# Patient Record
Sex: FEMALE | Race: WHITE | NOT HISPANIC OR LATINO | Employment: OTHER | URBAN - METROPOLITAN AREA
[De-identification: names, ages, dates, MRNs, and addresses within clinical notes are randomized per-mention and may not be internally consistent; named-entity substitution may affect disease eponyms.]

---

## 2017-07-27 ENCOUNTER — TRANSCRIBE ORDERS (OUTPATIENT)
Dept: ADMINISTRATIVE | Facility: HOSPITAL | Age: 72
End: 2017-07-27

## 2017-07-27 ENCOUNTER — HOSPITAL ENCOUNTER (OUTPATIENT)
Dept: CARDIOLOGY | Facility: HOSPITAL | Age: 72
Discharge: HOME | End: 2017-07-27
Attending: ORTHOPAEDIC SURGERY | Admitting: ORTHOPAEDIC SURGERY

## 2017-07-27 ENCOUNTER — LAB (OUTPATIENT)
Dept: LAB | Facility: HOSPITAL | Age: 72
End: 2017-07-27
Attending: ORTHOPAEDIC SURGERY

## 2017-07-27 DIAGNOSIS — Z01.811 PRE-OP CHEST EXAM: Primary | ICD-10-CM

## 2017-07-27 DIAGNOSIS — Z01.811 PRE-OP CHEST EXAM: ICD-10-CM

## 2017-07-27 DIAGNOSIS — Z01.818 PRE-OP EXAM: ICD-10-CM

## 2017-07-27 DIAGNOSIS — M20.10 HALLUX VALGUS (ACQUIRED), UNSPECIFIED FOOT: ICD-10-CM

## 2017-07-27 DIAGNOSIS — M20.10 HALLUX VALGUS (ACQUIRED), UNSPECIFIED FOOT: Primary | ICD-10-CM

## 2017-07-27 LAB
ALBUMIN SERPL-MCNC: 4.2 G/DL (ref 3.5–5.2)
ALBUMIN/GLOB SERPL: 1.3 G/DL
ALP SERPL-CCNC: 63 U/L (ref 39–117)
ALT SERPL W P-5'-P-CCNC: 20 U/L (ref 1–33)
ANION GAP SERPL CALCULATED.3IONS-SCNC: 12 MMOL/L
AST SERPL-CCNC: 23 U/L (ref 1–32)
BASOPHILS # BLD AUTO: 0.03 10*3/MM3 (ref 0–0.2)
BASOPHILS NFR BLD AUTO: 0.4 % (ref 0–1.5)
BILIRUB SERPL-MCNC: 0.4 MG/DL (ref 0.1–1.2)
BUN BLD-MCNC: 11 MG/DL (ref 8–23)
BUN/CREAT SERPL: 12.4 (ref 7–25)
CALCIUM SPEC-SCNC: 9.8 MG/DL (ref 8.6–10.5)
CHLORIDE SERPL-SCNC: 95 MMOL/L (ref 98–107)
CO2 SERPL-SCNC: 26 MMOL/L (ref 22–29)
CREAT BLD-MCNC: 0.89 MG/DL (ref 0.57–1)
DEPRECATED RDW RBC AUTO: 46.4 FL (ref 37–54)
EOSINOPHIL # BLD AUTO: 0.27 10*3/MM3 (ref 0–0.7)
EOSINOPHIL NFR BLD AUTO: 3.8 % (ref 0.3–6.2)
ERYTHROCYTE [DISTWIDTH] IN BLOOD BY AUTOMATED COUNT: 14.5 % (ref 11.7–13)
GFR SERPL CREATININE-BSD FRML MDRD: 62 ML/MIN/1.73
GLOBULIN UR ELPH-MCNC: 3.2 GM/DL
GLUCOSE BLD-MCNC: 125 MG/DL (ref 65–99)
HCT VFR BLD AUTO: 40.4 % (ref 35.6–45.5)
HGB BLD-MCNC: 13.2 G/DL (ref 11.9–15.5)
IMM GRANULOCYTES # BLD: 0.02 10*3/MM3 (ref 0–0.03)
IMM GRANULOCYTES NFR BLD: 0.3 % (ref 0–0.5)
LYMPHOCYTES # BLD AUTO: 0.96 10*3/MM3 (ref 0.9–4.8)
LYMPHOCYTES NFR BLD AUTO: 13.5 % (ref 19.6–45.3)
MCH RBC QN AUTO: 28.6 PG (ref 26.9–32)
MCHC RBC AUTO-ENTMCNC: 32.7 G/DL (ref 32.4–36.3)
MCV RBC AUTO: 87.6 FL (ref 80.5–98.2)
MONOCYTES # BLD AUTO: 0.76 10*3/MM3 (ref 0.2–1.2)
MONOCYTES NFR BLD AUTO: 10.7 % (ref 5–12)
NEUTROPHILS # BLD AUTO: 5.07 10*3/MM3 (ref 1.9–8.1)
NEUTROPHILS NFR BLD AUTO: 71.3 % (ref 42.7–76)
PLATELET # BLD AUTO: 265 10*3/MM3 (ref 140–500)
PMV BLD AUTO: 9.8 FL (ref 6–12)
POTASSIUM BLD-SCNC: 4.4 MMOL/L (ref 3.5–5.2)
PROT SERPL-MCNC: 7.4 G/DL (ref 6–8.5)
RBC # BLD AUTO: 4.61 10*6/MM3 (ref 3.9–5.2)
SODIUM BLD-SCNC: 133 MMOL/L (ref 136–145)
WBC NRBC COR # BLD: 7.11 10*3/MM3 (ref 4.5–10.7)

## 2017-07-27 PROCEDURE — 85025 COMPLETE CBC W/AUTO DIFF WBC: CPT

## 2017-07-27 PROCEDURE — 36415 COLL VENOUS BLD VENIPUNCTURE: CPT

## 2017-07-27 PROCEDURE — 80053 COMPREHEN METABOLIC PANEL: CPT

## 2017-07-27 PROCEDURE — 93010 ELECTROCARDIOGRAM REPORT: CPT | Performed by: INTERNAL MEDICINE

## 2017-07-27 PROCEDURE — 93005 ELECTROCARDIOGRAM TRACING: CPT | Performed by: ORTHOPAEDIC SURGERY

## 2017-12-18 ENCOUNTER — HOSPITAL ENCOUNTER (OUTPATIENT)
Dept: MRI IMAGING | Facility: HOSPITAL | Age: 72
Discharge: HOME | End: 2017-12-18
Admitting: NURSE PRACTITIONER

## 2017-12-18 DIAGNOSIS — I67.1 CEREBRAL ANEURYSM, NONRUPTURED: ICD-10-CM

## 2017-12-18 PROCEDURE — 70544 MR ANGIOGRAPHY HEAD W/O DYE: CPT

## 2018-01-24 ENCOUNTER — OFFICE VISIT (OUTPATIENT)
Dept: NEUROSURGERY | Facility: CLINIC | Age: 73
End: 2018-01-24

## 2018-01-24 VITALS
SYSTOLIC BLOOD PRESSURE: 126 MMHG | HEIGHT: 66 IN | RESPIRATION RATE: 18 BRPM | HEART RATE: 100 BPM | WEIGHT: 178 LBS | BODY MASS INDEX: 28.61 KG/M2 | DIASTOLIC BLOOD PRESSURE: 72 MMHG

## 2018-01-24 DIAGNOSIS — R90.89 ABNORMAL BRAIN MRI: ICD-10-CM

## 2018-01-24 DIAGNOSIS — I72.9 ANEURYSM (HCC): Primary | ICD-10-CM

## 2018-01-24 PROCEDURE — 99213 OFFICE O/P EST LOW 20 MIN: CPT | Performed by: NURSE PRACTITIONER

## 2018-01-24 NOTE — PROGRESS NOTES
"Subjective   Patient ID: Elena Siddiqui is a 72 y.o. female is here today for one year follow-up aneurysm. Patient presents accompanied by her .     History of Present Illness     She returns the office today for ongoing follow-up with known history of a cerebral aneurysm that has been followed with serial imaging.  She has undergone a new MRA imaging of the head at [facility] on December 18, 2017.  She has had no prior surgical intervention.  She was last here in December 2016 an MRI imaging at that time revealed stable findings.  She has a 3 mm right PCA aneurysm.    She denies any issues today with the exception of a few falls over the past 6 months.  She attributes this to a hammertoe problem with her foot that was causing her to walk \"weirdly.\"  She had surgery to correct the problem and is hopeful that her gait will improve.  She denies headaches, vision changes or any other neuro deficits with the exception of the falls.    She presents with her .      /72 (BP Location: Right arm, Patient Position: Sitting)  Pulse 100  Resp 18  Ht 167.6 cm (65.98\")  Wt 80.7 kg (178 lb)  BMI 28.74 kg/m2      The following portions of the patient's history were reviewed and updated as appropriate: allergies, current medications, past family history, past medical history, past social history, past surgical history and problem list.    Review of Systems   HENT: Negative for tinnitus.    Eyes: Negative for visual disturbance.   Musculoskeletal: Positive for gait problem.   Neurological: Positive for headaches (occ'l). Negative for dizziness, tremors, seizures, syncope, speech difficulty, weakness, light-headedness and numbness.   Psychiatric/Behavioral: Negative for confusion and decreased concentration.       Objective   Physical Exam   Constitutional: She appears well-developed and well-nourished. She is cooperative.   Very pleasant, older female   HENT:   Head: Atraumatic.   Neck: Neck supple. "   Pulmonary/Chest: Effort normal and breath sounds normal. No respiratory distress.   Abdominal: Soft.   Musculoskeletal: She exhibits no deformity.   Neurological: She is alert. She has normal strength and normal reflexes. She displays normal reflexes. No cranial nerve deficit or sensory deficit. Coordination and gait normal. GCS eye subscore is 4. GCS verbal subscore is 5. GCS motor subscore is 6.   Stable, upright gait, normal station  Cranial nerves II through XII grossly intact   Skin: Skin is warm and dry.   Psychiatric: She has a normal mood and affect. Her behavior is normal. Judgment and thought content normal.   Vitals reviewed.    Neurologic Exam     Motor Exam     Strength   Strength 5/5 throughout.       Assessment/Plan   Independent Review of Radiographic Studies:    MRA of the head from [facility] dated December 18, 2017 reveals no change when compared to prior to studies.  The cerebral aneurysm measures 3.8 x 3.1 x 3 mm in the right ICA, as reviewed and discussed with Dr. Hurley.    Medical Decision Making:    She returns to the office today for one-year follow-up with known history of a cerebral aneurysm.  Exam as noted above, no red flags.  She has no neuro changes or deficits on exam.  She is doing and feeling well.  MRA imaging compared to the prior 2 studies shows no changes.  After discussing the imaging findings with Dr. Hurley, we will have her return to the office in 5 years.  I did reiterate signs and symptoms of aneurysm rupture and told her that she should go to the ER with any of these changes.  She expressed understanding.  We will have her return the office in 5 years with repeat imaging.  She is to call at anytime with any questions or concerns.    Plan: Return to office in 5 years with MRA head  Elena was seen today for cerebral aneurysm.    Diagnoses and all orders for this visit:    Aneurysm  -     MRI Angiogram Head Without Contrast; Future    Abnormal brain MRI  -      MRI Angiogram Head Without Contrast; Future      Return in about 5 years (around 1/24/2023).

## 2018-02-06 ENCOUNTER — APPOINTMENT (OUTPATIENT)
Dept: PREADMISSION TESTING | Facility: HOSPITAL | Age: 73
End: 2018-02-06

## 2018-02-06 VITALS
BODY MASS INDEX: 29.57 KG/M2 | SYSTOLIC BLOOD PRESSURE: 149 MMHG | WEIGHT: 184 LBS | DIASTOLIC BLOOD PRESSURE: 79 MMHG | OXYGEN SATURATION: 97 % | HEIGHT: 66 IN | TEMPERATURE: 97.1 F | HEART RATE: 100 BPM | RESPIRATION RATE: 20 BRPM

## 2018-02-06 LAB
ALBUMIN SERPL-MCNC: 4.1 G/DL (ref 3.5–5.2)
ALBUMIN/GLOB SERPL: 1.3 G/DL
ALP SERPL-CCNC: 60 U/L (ref 39–117)
ALT SERPL W P-5'-P-CCNC: 15 U/L (ref 1–33)
ANION GAP SERPL CALCULATED.3IONS-SCNC: 14.3 MMOL/L
AST SERPL-CCNC: 18 U/L (ref 1–32)
BASOPHILS # BLD AUTO: 0.03 10*3/MM3 (ref 0–0.2)
BASOPHILS NFR BLD AUTO: 0.3 % (ref 0–1.5)
BILIRUB SERPL-MCNC: 0.2 MG/DL (ref 0.1–1.2)
BUN BLD-MCNC: 18 MG/DL (ref 8–23)
BUN/CREAT SERPL: 17.5 (ref 7–25)
CALCIUM SPEC-SCNC: 9.2 MG/DL (ref 8.6–10.5)
CHLORIDE SERPL-SCNC: 87 MMOL/L (ref 98–107)
CO2 SERPL-SCNC: 23.7 MMOL/L (ref 22–29)
CREAT BLD-MCNC: 1.03 MG/DL (ref 0.57–1)
DEPRECATED RDW RBC AUTO: 44.7 FL (ref 37–54)
EOSINOPHIL # BLD AUTO: 0.24 10*3/MM3 (ref 0–0.7)
EOSINOPHIL NFR BLD AUTO: 2.2 % (ref 0.3–6.2)
ERYTHROCYTE [DISTWIDTH] IN BLOOD BY AUTOMATED COUNT: 14.3 % (ref 11.7–13)
GFR SERPL CREATININE-BSD FRML MDRD: 52 ML/MIN/1.73
GLOBULIN UR ELPH-MCNC: 3.2 GM/DL
GLUCOSE BLD-MCNC: 123 MG/DL (ref 65–99)
HCT VFR BLD AUTO: 40.2 % (ref 35.6–45.5)
HGB BLD-MCNC: 13.2 G/DL (ref 11.9–15.5)
IMM GRANULOCYTES # BLD: 0.04 10*3/MM3 (ref 0–0.03)
IMM GRANULOCYTES NFR BLD: 0.4 % (ref 0–0.5)
LYMPHOCYTES # BLD AUTO: 1.89 10*3/MM3 (ref 0.9–4.8)
LYMPHOCYTES NFR BLD AUTO: 17.3 % (ref 19.6–45.3)
MCH RBC QN AUTO: 28.6 PG (ref 26.9–32)
MCHC RBC AUTO-ENTMCNC: 32.8 G/DL (ref 32.4–36.3)
MCV RBC AUTO: 87 FL (ref 80.5–98.2)
MONOCYTES # BLD AUTO: 0.94 10*3/MM3 (ref 0.2–1.2)
MONOCYTES NFR BLD AUTO: 8.6 % (ref 5–12)
NEUTROPHILS # BLD AUTO: 7.77 10*3/MM3 (ref 1.9–8.1)
NEUTROPHILS NFR BLD AUTO: 71.2 % (ref 42.7–76)
PLATELET # BLD AUTO: 300 10*3/MM3 (ref 140–500)
PMV BLD AUTO: 9.6 FL (ref 6–12)
POTASSIUM BLD-SCNC: 3.9 MMOL/L (ref 3.5–5.2)
PROT SERPL-MCNC: 7.3 G/DL (ref 6–8.5)
RBC # BLD AUTO: 4.62 10*6/MM3 (ref 3.9–5.2)
SODIUM BLD-SCNC: 125 MMOL/L (ref 136–145)
WBC NRBC COR # BLD: 10.91 10*3/MM3 (ref 4.5–10.7)

## 2018-02-06 PROCEDURE — 93005 ELECTROCARDIOGRAM TRACING: CPT

## 2018-02-06 PROCEDURE — 80053 COMPREHEN METABOLIC PANEL: CPT | Performed by: ORTHOPAEDIC SURGERY

## 2018-02-06 PROCEDURE — 93010 ELECTROCARDIOGRAM REPORT: CPT | Performed by: INTERNAL MEDICINE

## 2018-02-06 PROCEDURE — 85025 COMPLETE CBC W/AUTO DIFF WBC: CPT | Performed by: ORTHOPAEDIC SURGERY

## 2018-02-06 PROCEDURE — 36415 COLL VENOUS BLD VENIPUNCTURE: CPT

## 2018-02-06 NOTE — DISCHARGE INSTRUCTIONS
Take the following medications the morning of surgery with a small sip of water:  NONE      General Instructions:  • Do not eat solid food after midnight the night before surgery.  • You may drink clear liquids day of surgery but must stop at least one hour before your hospital arrival time.  • It is beneficial for you to have a clear drink that contains carbohydrates the day of surgery.  We suggest a 12 to 20 ounce bottle of Gatorade or Powerade for non-diabetic patients or a 12 to 20 ounce bottle of G2 or Powerade Zero for diabetic patients. (Pediatric patients, are not advised to drink a 12 to 20 ounce carbohydrate drink)    Clear liquids are liquids you can see through.  Nothing red in color.     Plain water                               Sports drinks  Sodas                                   Gelatin (Jell-O)  Fruit juices without pulp such as white grape juice and apple juice  Popsicles that contain no fruit or yogurt  Tea or coffee (no cream or milk added)  Gatorade / Powerade  G2 / Powerade Zero    • Infants may have breast milk up to four hours before surgery.  • Infants drinking formula may drink formula up to six hours before surgery.   • Patients who avoid smoking, chewing tobacco and alcohol for 4 weeks prior to surgery have a reduced risk of post-operative complications.  Quit smoking as many days before surgery as you can.  • Do not smoke, use chewing tobacco or drink alcohol the day of surgery.   • If applicable bring your C-PAP/ BI-PAP machine.  • Bring any papers given to you in the doctor’s office.  • Wear clean comfortable clothes and socks.  • Do not wear contact lenses or make-up.  Bring a case for your glasses.   • Bring crutches or walker if applicable.  • Remove all piercings.  Leave jewelry and any other valuables at home.  • Hair extensions with metal clips must be removed prior to surgery.  • The Pre-Admission Testing nurse will instruct you to bring medications if unable to obtain an  accurate list in Pre-Admission Testing.        If you were given a blood bank ID arm band remember to bring it with you the day of surgery.    Preventing a Surgical Site Infection:  • For 2 to 3 days before surgery, avoid shaving with a razor because the razor can irritate skin and make it easier to develop an infection.  • The night prior to surgery sleep in a clean bed with clean clothing.  Do not allow pets to sleep with you.  • Shower on the morning of surgery using a fresh bar of anti-bacterial soap (such as Dial) and clean washcloth.  Dry with a clean towel and dress in clean clothing.  • Ask your surgeon if you will be receiving antibiotics prior to surgery.  • Make sure you, your family, and all healthcare providers clean their hands with soap and water or an alcohol based hand  before caring for you or your wound.    Day of surgery:2/9/2018 ARRIVAL TIME 6:00 AM  Upon arrival, a Pre-op nurse and Anesthesiologist will review your health history, obtain vital signs, and answer questions you may have.  The only belongings needed at this time will be your home medications and if applicable your C-PAP/BI-PAP machine.  If you are staying overnight your family can leave the rest of your belongings in the car and bring them to your room later.  A Pre-op nurse will start an IV and you may receive medication in preparation for surgery, including something to help you relax.  Your family will be able to see you in the Pre-op area.  While you are in surgery your family should notify the waiting room  if they leave the waiting room area and provide a contact phone number.    Please be aware that surgery does come with discomfort.  We want to make every effort to control your discomfort so please discuss any uncontrolled symptoms with your nurse.   Your doctor will most likely have prescribed pain medications.      If you are going home after surgery you will receive individualized written care  instructions before being discharged.  A responsible adult must drive you to and from the hospital on the day of your surgery and stay with you for 24 hours.    If you are staying overnight following surgery, you will be transported to your hospital room following the recovery period.  [facility] has all private rooms.    If you have any questions please call Pre-Admission Testing at [phone].  Deductibles and co-payments are collected on the day of service. Please be prepared to pay the required co-pay, deductible or deposit on the day of service as defined by your plan.

## 2018-02-09 ENCOUNTER — ANESTHESIA (OUTPATIENT)
Dept: PERIOP | Facility: HOSPITAL | Age: 73
End: 2018-02-09

## 2018-02-09 ENCOUNTER — ANESTHESIA EVENT (OUTPATIENT)
Dept: PERIOP | Facility: HOSPITAL | Age: 73
End: 2018-02-09

## 2018-02-09 ENCOUNTER — APPOINTMENT (OUTPATIENT)
Dept: GENERAL RADIOLOGY | Facility: HOSPITAL | Age: 73
End: 2018-02-09

## 2018-02-09 ENCOUNTER — HOSPITAL ENCOUNTER (OUTPATIENT)
Facility: HOSPITAL | Age: 73
Setting detail: HOSPITAL OUTPATIENT SURGERY
Discharge: HOME | End: 2018-02-09
Attending: ORTHOPAEDIC SURGERY | Admitting: ORTHOPAEDIC SURGERY

## 2018-02-09 VITALS
HEART RATE: 72 BPM | SYSTOLIC BLOOD PRESSURE: 132 MMHG | BODY MASS INDEX: 28.57 KG/M2 | TEMPERATURE: 97.9 F | DIASTOLIC BLOOD PRESSURE: 75 MMHG | RESPIRATION RATE: 16 BRPM | OXYGEN SATURATION: 93 % | WEIGHT: 177 LBS

## 2018-02-09 PROCEDURE — 73660 X-RAY EXAM OF TOE(S): CPT

## 2018-02-09 PROCEDURE — 76000 FLUOROSCOPY <1 HR PHYS/QHP: CPT

## 2018-02-09 PROCEDURE — C1713 ANCHOR/SCREW BN/BN,TIS/BN: HCPCS | Performed by: ORTHOPAEDIC SURGERY

## 2018-02-09 PROCEDURE — 25010000002 ROPIVACAINE PER 1 MG: Performed by: ANESTHESIOLOGY

## 2018-02-09 PROCEDURE — 25010000002 FENTANYL CITRATE (PF) 100 MCG/2ML SOLUTION: Performed by: NURSE ANESTHETIST, CERTIFIED REGISTERED

## 2018-02-09 PROCEDURE — 25010000003 CEFAZOLIN IN DEXTROSE 2-4 GM/100ML-% SOLUTION: Performed by: ORTHOPAEDIC SURGERY

## 2018-02-09 PROCEDURE — 25010000002 MIDAZOLAM PER 1 MG: Performed by: ANESTHESIOLOGY

## 2018-02-09 PROCEDURE — 25010000002 FENTANYL CITRATE (PF) 100 MCG/2ML SOLUTION: Performed by: ANESTHESIOLOGY

## 2018-02-09 PROCEDURE — 25010000002 MIDAZOLAM PER 1 MG: Performed by: NURSE ANESTHETIST, CERTIFIED REGISTERED

## 2018-02-09 PROCEDURE — 25010000002 PHENYLEPHRINE PER 1 ML: Performed by: NURSE ANESTHETIST, CERTIFIED REGISTERED

## 2018-02-09 PROCEDURE — 25010000002 PROPOFOL 10 MG/ML EMULSION: Performed by: NURSE ANESTHETIST, CERTIFIED REGISTERED

## 2018-02-09 DEVICE — KWIRED TROC 9X.062IN: Type: IMPLANTABLE DEVICE | Site: FOOT | Status: FUNCTIONAL

## 2018-02-09 DEVICE — BALL PIN JURGAN .062 GRN: Type: IMPLANTABLE DEVICE | Site: FOOT | Status: FUNCTIONAL

## 2018-02-09 RX ORDER — PROMETHAZINE HYDROCHLORIDE 25 MG/1
25 SUPPOSITORY RECTAL ONCE AS NEEDED
Status: DISCONTINUED | OUTPATIENT
Start: 2018-02-09 | End: 2018-02-09 | Stop reason: HOSPADM

## 2018-02-09 RX ORDER — LABETALOL HYDROCHLORIDE 5 MG/ML
5 INJECTION, SOLUTION INTRAVENOUS
Status: DISCONTINUED | OUTPATIENT
Start: 2018-02-09 | End: 2018-02-09 | Stop reason: HOSPADM

## 2018-02-09 RX ORDER — HYDROCODONE BITARTRATE AND ACETAMINOPHEN 7.5; 325 MG/1; MG/1
1 TABLET ORAL ONCE AS NEEDED
Status: DISCONTINUED | OUTPATIENT
Start: 2018-02-09 | End: 2018-02-09 | Stop reason: HOSPADM

## 2018-02-09 RX ORDER — ROPIVACAINE HYDROCHLORIDE 5 MG/ML
INJECTION, SOLUTION EPIDURAL; INFILTRATION; PERINEURAL AS NEEDED
Status: DISCONTINUED | OUTPATIENT
Start: 2018-02-09 | End: 2018-02-09 | Stop reason: SURG

## 2018-02-09 RX ORDER — PROMETHAZINE HYDROCHLORIDE 25 MG/1
25 TABLET ORAL ONCE AS NEEDED
Status: DISCONTINUED | OUTPATIENT
Start: 2018-02-09 | End: 2018-02-09 | Stop reason: HOSPADM

## 2018-02-09 RX ORDER — OXYCODONE HYDROCHLORIDE AND ACETAMINOPHEN 5; 325 MG/1; MG/1
1 TABLET ORAL EVERY 4 HOURS PRN
Qty: 70 TABLET | Refills: 0 | Status: SHIPPED | OUTPATIENT
Start: 2018-02-09 | End: 2019-07-08

## 2018-02-09 RX ORDER — PROMETHAZINE HYDROCHLORIDE 25 MG/ML
6.25 INJECTION, SOLUTION INTRAMUSCULAR; INTRAVENOUS ONCE AS NEEDED
Status: DISCONTINUED | OUTPATIENT
Start: 2018-02-09 | End: 2018-02-09 | Stop reason: HOSPADM

## 2018-02-09 RX ORDER — FLUMAZENIL 0.1 MG/ML
0.2 INJECTION INTRAVENOUS AS NEEDED
Status: DISCONTINUED | OUTPATIENT
Start: 2018-02-09 | End: 2018-02-09 | Stop reason: HOSPADM

## 2018-02-09 RX ORDER — FAMOTIDINE 10 MG/ML
20 INJECTION, SOLUTION INTRAVENOUS ONCE
Status: COMPLETED | OUTPATIENT
Start: 2018-02-09 | End: 2018-02-09

## 2018-02-09 RX ORDER — MIDAZOLAM HYDROCHLORIDE 1 MG/ML
INJECTION INTRAMUSCULAR; INTRAVENOUS AS NEEDED
Status: DISCONTINUED | OUTPATIENT
Start: 2018-02-09 | End: 2018-02-09 | Stop reason: SURG

## 2018-02-09 RX ORDER — MIDAZOLAM HYDROCHLORIDE 1 MG/ML
1 INJECTION INTRAMUSCULAR; INTRAVENOUS
Status: DISCONTINUED | OUTPATIENT
Start: 2018-02-09 | End: 2018-02-09 | Stop reason: HOSPADM

## 2018-02-09 RX ORDER — CEFAZOLIN SODIUM 2 G/100ML
2 INJECTION, SOLUTION INTRAVENOUS ONCE
Status: COMPLETED | OUTPATIENT
Start: 2018-02-09 | End: 2018-02-09

## 2018-02-09 RX ORDER — MIDAZOLAM HYDROCHLORIDE 1 MG/ML
2 INJECTION INTRAMUSCULAR; INTRAVENOUS
Status: DISCONTINUED | OUTPATIENT
Start: 2018-02-09 | End: 2018-02-09 | Stop reason: HOSPADM

## 2018-02-09 RX ORDER — HYDRALAZINE HYDROCHLORIDE 20 MG/ML
5 INJECTION INTRAMUSCULAR; INTRAVENOUS
Status: DISCONTINUED | OUTPATIENT
Start: 2018-02-09 | End: 2018-02-09 | Stop reason: HOSPADM

## 2018-02-09 RX ORDER — OXYCODONE HYDROCHLORIDE AND ACETAMINOPHEN 5; 325 MG/1; MG/1
2 TABLET ORAL ONCE AS NEEDED
Status: COMPLETED | OUTPATIENT
Start: 2018-02-09 | End: 2018-02-09

## 2018-02-09 RX ORDER — DIPHENHYDRAMINE HYDROCHLORIDE 50 MG/ML
6.25 INJECTION INTRAMUSCULAR; INTRAVENOUS
Status: DISCONTINUED | OUTPATIENT
Start: 2018-02-09 | End: 2018-02-09 | Stop reason: HOSPADM

## 2018-02-09 RX ORDER — SODIUM CHLORIDE, SODIUM LACTATE, POTASSIUM CHLORIDE, CALCIUM CHLORIDE 600; 310; 30; 20 MG/100ML; MG/100ML; MG/100ML; MG/100ML
9 INJECTION, SOLUTION INTRAVENOUS CONTINUOUS
Status: DISCONTINUED | OUTPATIENT
Start: 2018-02-09 | End: 2018-02-09 | Stop reason: HOSPADM

## 2018-02-09 RX ORDER — SODIUM CHLORIDE 0.9 % (FLUSH) 0.9 %
1-10 SYRINGE (ML) INJECTION AS NEEDED
Status: DISCONTINUED | OUTPATIENT
Start: 2018-02-09 | End: 2018-02-09 | Stop reason: HOSPADM

## 2018-02-09 RX ORDER — FENTANYL CITRATE 50 UG/ML
50 INJECTION, SOLUTION INTRAMUSCULAR; INTRAVENOUS
Status: DISCONTINUED | OUTPATIENT
Start: 2018-02-09 | End: 2018-02-09 | Stop reason: HOSPADM

## 2018-02-09 RX ORDER — PROPOFOL 10 MG/ML
VIAL (ML) INTRAVENOUS AS NEEDED
Status: DISCONTINUED | OUTPATIENT
Start: 2018-02-09 | End: 2018-02-09 | Stop reason: SURG

## 2018-02-09 RX ORDER — MAGNESIUM HYDROXIDE 1200 MG/15ML
LIQUID ORAL AS NEEDED
Status: DISCONTINUED | OUTPATIENT
Start: 2018-02-09 | End: 2018-02-09 | Stop reason: HOSPADM

## 2018-02-09 RX ORDER — EPHEDRINE SULFATE 50 MG/ML
5 INJECTION, SOLUTION INTRAVENOUS ONCE AS NEEDED
Status: DISCONTINUED | OUTPATIENT
Start: 2018-02-09 | End: 2018-02-09 | Stop reason: HOSPADM

## 2018-02-09 RX ORDER — FENTANYL CITRATE 50 UG/ML
100 INJECTION, SOLUTION INTRAMUSCULAR; INTRAVENOUS
Status: DISCONTINUED | OUTPATIENT
Start: 2018-02-09 | End: 2018-02-09 | Stop reason: HOSPADM

## 2018-02-09 RX ORDER — FENTANYL CITRATE 50 UG/ML
INJECTION, SOLUTION INTRAMUSCULAR; INTRAVENOUS AS NEEDED
Status: DISCONTINUED | OUTPATIENT
Start: 2018-02-09 | End: 2018-02-09 | Stop reason: SURG

## 2018-02-09 RX ORDER — PROPOFOL 10 MG/ML
VIAL (ML) INTRAVENOUS CONTINUOUS PRN
Status: DISCONTINUED | OUTPATIENT
Start: 2018-02-09 | End: 2018-02-09 | Stop reason: SURG

## 2018-02-09 RX ORDER — LIDOCAINE HYDROCHLORIDE 20 MG/ML
INJECTION, SOLUTION INFILTRATION; PERINEURAL AS NEEDED
Status: DISCONTINUED | OUTPATIENT
Start: 2018-02-09 | End: 2018-02-09 | Stop reason: SURG

## 2018-02-09 RX ORDER — LIDOCAINE HYDROCHLORIDE 10 MG/ML
0.5 INJECTION, SOLUTION EPIDURAL; INFILTRATION; INTRACAUDAL; PERINEURAL ONCE AS NEEDED
Status: COMPLETED | OUTPATIENT
Start: 2018-02-09 | End: 2018-02-09

## 2018-02-09 RX ORDER — ONDANSETRON 2 MG/ML
4 INJECTION INTRAMUSCULAR; INTRAVENOUS ONCE AS NEEDED
Status: DISCONTINUED | OUTPATIENT
Start: 2018-02-09 | End: 2018-02-09 | Stop reason: HOSPADM

## 2018-02-09 RX ORDER — OXYCODONE HYDROCHLORIDE AND ACETAMINOPHEN 5; 325 MG/1; MG/1
TABLET ORAL
Status: COMPLETED
Start: 2018-02-09 | End: 2018-02-09

## 2018-02-09 RX ADMIN — PHENYLEPHRINE HYDROCHLORIDE 100 MCG: 10 INJECTION INTRAVENOUS at 09:15

## 2018-02-09 RX ADMIN — PROPOFOL 120 MCG/KG/MIN: 10 INJECTION, EMULSION INTRAVENOUS at 08:31

## 2018-02-09 RX ADMIN — LIDOCAINE HYDROCHLORIDE 0.5 ML: 10 INJECTION, SOLUTION EPIDURAL; INFILTRATION; INTRACAUDAL; PERINEURAL at 07:17

## 2018-02-09 RX ADMIN — SODIUM CHLORIDE, POTASSIUM CHLORIDE, SODIUM LACTATE AND CALCIUM CHLORIDE 9 ML/HR: 600; 310; 30; 20 INJECTION, SOLUTION INTRAVENOUS at 07:18

## 2018-02-09 RX ADMIN — FENTANYL CITRATE 50 MCG: 50 INJECTION INTRAMUSCULAR; INTRAVENOUS at 08:28

## 2018-02-09 RX ADMIN — MIDAZOLAM 1 MG: 1 INJECTION INTRAMUSCULAR; INTRAVENOUS at 08:28

## 2018-02-09 RX ADMIN — OXYCODONE HYDROCHLORIDE AND ACETAMINOPHEN 1 TABLET: 5; 325 TABLET ORAL at 09:52

## 2018-02-09 RX ADMIN — SODIUM CHLORIDE, POTASSIUM CHLORIDE, SODIUM LACTATE AND CALCIUM CHLORIDE: 600; 310; 30; 20 INJECTION, SOLUTION INTRAVENOUS at 08:55

## 2018-02-09 RX ADMIN — MIDAZOLAM 2 MG: 1 INJECTION INTRAMUSCULAR; INTRAVENOUS at 07:53

## 2018-02-09 RX ADMIN — FENTANYL CITRATE 50 MCG: 50 INJECTION, SOLUTION INTRAMUSCULAR; INTRAVENOUS at 10:13

## 2018-02-09 RX ADMIN — ROPIVACAINE HYDROCHLORIDE 30 ML: 5 INJECTION, SOLUTION EPIDURAL; INFILTRATION; PERINEURAL at 07:58

## 2018-02-09 RX ADMIN — PHENYLEPHRINE HYDROCHLORIDE 100 MCG: 10 INJECTION INTRAVENOUS at 08:53

## 2018-02-09 RX ADMIN — FENTANYL CITRATE 100 MCG: 50 INJECTION, SOLUTION INTRAMUSCULAR; INTRAVENOUS at 07:53

## 2018-02-09 RX ADMIN — PHENYLEPHRINE HYDROCHLORIDE 100 MCG: 10 INJECTION INTRAVENOUS at 09:03

## 2018-02-09 RX ADMIN — PROPOFOL 60 MG: 10 INJECTION, EMULSION INTRAVENOUS at 08:31

## 2018-02-09 RX ADMIN — FAMOTIDINE 20 MG: 10 INJECTION INTRAVENOUS at 07:58

## 2018-02-09 RX ADMIN — LIDOCAINE HYDROCHLORIDE 30 MG: 20 INJECTION, SOLUTION INFILTRATION; PERINEURAL at 08:28

## 2018-02-09 RX ADMIN — PHENYLEPHRINE HYDROCHLORIDE 100 MCG: 10 INJECTION INTRAVENOUS at 08:58

## 2018-02-09 RX ADMIN — CEFAZOLIN SODIUM 2 G: 2 INJECTION, SOLUTION INTRAVENOUS at 08:40

## 2018-02-09 NOTE — PLAN OF CARE
Problem: Patient Care Overview (Adult)  Goal: Plan of Care Review  Outcome: Ongoing (interventions implemented as appropriate)   02/09/18 1035   Coping/Psychosocial Response Interventions   Plan Of Care Reviewed With patient;spouse     Goal: Discharge Needs Assessment  Outcome: Outcome(s) achieved Date Met: 02/09/18 02/09/18 1035   Discharge Needs Assessment   Concerns To Be Addressed no discharge needs identified   Equipment Needed After Discharge (Surgical shoe in place. Has walker, wheelchair.)

## 2018-02-09 NOTE — PLAN OF CARE
Problem: Patient Care Overview (Adult)  Goal: Plan of Care Review  Outcome: Ongoing (interventions implemented as appropriate)   02/09/18 0652   Coping/Psychosocial Response Interventions   Plan Of Care Reviewed With patient   Patient Care Overview   Progress improving

## 2018-02-09 NOTE — ANESTHESIA PREPROCEDURE EVALUATION
Anesthesia Evaluation     Patient summary reviewed and Nursing notes reviewed   NPO Solid Status: > 8 hours             Airway   Mallampati: II  TM distance: >3 FB  Neck ROM: full  no difficulty expected  Dental - normal exam     Pulmonary - negative pulmonary ROS and normal exam   Cardiovascular - normal exam    (+) hypertension,       Neuro/Psych- negative ROS  GI/Hepatic/Renal/Endo - negative ROS     Musculoskeletal (-) negative ROS    Abdominal  - normal exam   Substance History - negative use     OB/GYN negative ob/gyn ROS         Other        ROS/Med Hx Other: Cerebral aneurysm                  Anesthesia Plan    ASA 3     MAC   (Ankle block popc)  intravenous induction   Anesthetic plan and risks discussed with patient.    Plan discussed with CRNA.

## 2018-02-09 NOTE — ANESTHESIA PROCEDURE NOTES
Peripheral Block    Patient location during procedure: holding area  Start time: 2/9/2018 7:50 AM  Stop time: 2/9/2018 7:58 AM  Reason for block: at surgeon's request and post-op pain management  Performed by  Anesthesiologist: MILDRED GAMA  Preanesthetic Checklist  Completed: patient identified, site marked, surgical consent, pre-op evaluation, timeout performed, IV checked, risks and benefits discussed and monitors and equipment checked  Prep:  Pt Position: supine  Sterile barriers:cap, gloves, mask and sterile barriers  Prep: ChloraPrep  Patient monitoring: blood pressure monitoring, continuous pulse oximetry and EKG  Procedure  Sedation:yes  Performed under: local infiltration  Guidance:ultrasound guided  ULTRASOUND INTERPRETATION. Using ultrasound guidance a 22 G gauge needle was placed in close proximity to the nerve, at which point, under ultrasound guidance anesthetic was injected in the area of the nerve and spread of the anesthesia was seen on ultrasound in close proximity thereto.  There were no abnormalities seen on ultrasound; a digital image was taken; and the patient tolerated the procedure with no complications.   Laterality:left  Block Type:ankle  Injection Technique:single-shotNeedle Gauge:25 G  Resistance on Injection: less than 15 psi  Medications  Local Injected:ropivacaine 0.5% Local Amount Injected:30mL  Post Assessment  Injection Assessment: negative aspiration for heme, no paresthesia on injection and incremental injection  Patient Tolerance:comfortable throughout block  Complications:yes and no

## 2018-02-09 NOTE — ANESTHESIA POSTPROCEDURE EVALUATION
Patient: Elena Siddiqui    Procedure Summary     Date Anesthesia Start Anesthesia Stop Room / Location    02/09/18 0828 0938  [facility]       Procedure Diagnosis Surgeon Provider    LEFT 2ND HAMMER TOE CORRECTION  CORRECTION OF ANGULAR DEFORMITY (Left Foot) No diagnosis on file. MD iMles Toledo Jr., MD          Anesthesia Type: MAC  Last vitals  BP   132/75 (02/09/18 1015)   Temp   36.6 °C (97.9 °F) (02/09/18 1015)   Pulse   72 (02/09/18 1015)   Resp   16 (02/09/18 1015)     SpO2   93 % (02/09/18 1015)     Post Anesthesia Care and Evaluation    Patient location during evaluation: bedside  Patient participation: complete - patient participated  Level of consciousness: awake and alert  Pain management: adequate  Airway patency: patent  Anesthetic complications: No anesthetic complications    Cardiovascular status: acceptable  Respiratory status: acceptable  Hydration status: acceptable    Comments: /75 (BP Location: Right arm, Patient Position: Lying)  Pulse 72  Temp 36.6 °C (97.9 °F) (Temporal Artery )   Resp 16  Wt 80.3 kg (177 lb)  SpO2 93%  BMI 28.57 kg/m2

## 2018-02-09 NOTE — DISCHARGE INSTRUCTIONS
Outpatient Surgery Guidelines, Adult  Outpatient procedures are those for which the person having the procedure is allowed to go home the same day as the procedure. Various procedures are done on an outpatient basis. You should follow some general guidelines if you will be having an outpatient procedure.    AFTER THE PROCEDURE  After surgery, you will be taken to a recovery area, where your progress will be monitored. If there are no complications, you will be allowed to go home when you are awake, stable, and taking fluids well. You may have numbness around the surgical site. Healing will take some time. You will have tenderness at the surgical site and may have some swelling and bruising. You may also have some nausea.  HOME CARE INSTRUCTIONS  · Do not drive for 24 hours, or as directed by your health care provider. Do not drive while taking prescription pain medicines.  · Do not drink alcohol for 24 hours.  · Do not make important decisions or sign legal documents for 24 hours.  · You may resume a normal diet and activities as directed.  · Do not lift anything heavier than 10 pounds (4.5 kg) or play contact sports until your health care provider says it is okay.  · Change your bandages (dressings) as directed.  · Only take over-the-counter or prescription medicines as directed by your health care provider.  · Follow up with your health care provider as directed.  SEEK MEDICAL CARE IF:  · You have increased bleeding (more than a small spot) from the surgical site.  · You have redness, swelling, or increasing pain in the wound.  · You see pus coming from the wound.  · You have a fever.  · You notice a bad smell coming from the wound or dressing.  · You feel lightheaded or faint.  · You develop a rash.  · You have trouble breathing.  · You develop allergies.  MAKE SURE YOU:  · Understand these instructions.  · Will watch your condition.  · Will get help right away if you are not doing well or get worse.     This  information is not intended to replace advice given to you by your health care provider. Make sure you discuss any questions you have with your health care provider.     Document Released: 09/12/2002 Document Revised: 05/03/2016 Document Reviewed: 05/22/2014  [employer] Patient Education ©2016 [employer]        What to expect after a Nerve Block    Nerve blocks administered to block pain affect many types of nerves, including those nerves that control movement, pain, and normal sensation. Following a nerve block, you may notice some bruising at the site where the block was given. You may experience sensations such as: numbness of the affected area or limb, tingling, heaviness (that is the limb feels heavy to you), weakness or inability to move the affected arm or leg, or a feeling as if your arm or leg has “fallen asleep.”     A nerve block can last from 2 to 36 hours depending on the medications used.  Usually the weakness wears off first followed by the tingling and heaviness. As the block wears off, you may begin to notice pain; however, this sequence of events may occur in any order. Typically, you will be able to move your limb before you will feel it. Once a nerve block begins to wear off, the effects are usually completely gone within 60 minutes.  If you experience continued side effects that you believe are block related for longer than 48 hours, please call your healthcare provider. Please see block-specific instructions below.    Instructions for any Block involving the leg/foot:   If you have had a leg /foot block, you should not bear weight on the affected leg until the block has worn off. After the block has worn off, weight bearing should be as directed by your surgeon. You may be sent home with crutches. You are at high risk for falling because of the anesthetic effects on your leg. Please use caution when standing or trying to move or walk. Have someone assist you until your leg and foot  function have returned to normal.     Protection of a “blocked” limb  • After a nerve block, you cannot feel pain, pressure, or extremes of temperature in the affected limb. And because of this, your blocked limb is at more risk for injury. For example, it is possible to burn your limb on an extremely hot surface without feeling it.     • When resting, it is important to reposition your limb periodically to avoid prolonged pressure on it. This may require the use of pillows and padding.    • While sleeping, you should avoid rolling onto the affected limb or putting too much pressure on it.     • If you have a cast or tight dressing, check the color of your fingers or toes of the affected limb. Call your surgeon if they look discolored (that is, dusky, dark colored).    • Use caution in cold weather. Cover your limb appropriately to protect it from the cold.    Pain Management:  Your surgeon will give you a prescription for pain medication. Begin taking this before the nerve block wears off. Bear in mind that sometimes the block can wear off in the middle of the night.     Acetaminophen; Oxycodone tablets  What is this medicine?  ACETAMINOPHEN; OXYCODONE (a set a RANDY lkue fen; ox i KOE done) is a pain reliever. It is used to treat mild to moderate pain.  This medicine may be used for other purposes; ask your health care provider or pharmacist if you have questions.  COMMON BRAND NAME(S): Endocet, Magnacet, Narvox, Percocet, Perloxx, Primalev, Primlev, Roxicet, Xolox  What should I tell my health care provider before I take this medicine?  They need to know if you have any of these conditions:  -brain tumor  -Crohn's disease, inflammatory bowel disease, or ulcerative colitis  -drug abuse or addiction  -head injury  -heart or circulation problems  -if you often drink alcohol  -kidney disease or problems going to the bathroom  -liver disease  -lung disease, asthma, or breathing problems  -an unusual or allergic reaction  to acetaminophen, oxycodone, other opioid analgesics, other medicines, foods, dyes, or preservatives  -pregnant or trying to get pregnant  -breast-feeding  How should I use this medicine?  Take this medicine by mouth with a full glass of water. Follow the directions on the prescription label. Take your medicine at regular intervals. Do not take your medicine more often than directed.  Talk to your pediatrician regarding the use of this medicine in children. Special care may be needed.  Patients over 65 years old may have a stronger reaction and need a smaller dose.  Overdosage: If you think you have taken too much of this medicine contact a poison control center or emergency room at once.  NOTE: This medicine is only for you. Do not share this medicine with others.  What if I miss a dose?  If you miss a dose, take it as soon as you can. If it is almost time for your next dose, take only that dose. Do not take double or extra doses.  What may interact with this medicine?  -alcohol  -antihistamines  -barbiturates like amobarbital, butalbital, butabarbital, methohexital, pentobarbital, phenobarbital, thiopental, and secobarbital  -benztropine  -drugs for bladder problems like solifenacin, trospium, oxybutynin, tolterodine, hyoscyamine, and methscopolamine  -drugs for breathing problems like ipratropium and tiotropium  -drugs for certain stomach or intestine problems like propantheline, homatropine methylbromide, glycopyrrolate, atropine, belladonna, and dicyclomine  -general anesthetics like etomidate, ketamine, nitrous oxide, propofol, desflurane, enflurane, halothane, isoflurane, and sevoflurane  -medicines for depression, anxiety, or psychotic disturbances  -medicines for sleep  -muscle relaxants  -naltrexone  -narcotic medicines (opiates) for pain  -phenothiazines like perphenazine, thioridazine, chlorpromazine, mesoridazine, fluphenazine, prochlorperazine, promazine, and  trifluoperazine  -scopolamine  -tramadol  -trihexyphenidyl  This list may not describe all possible interactions. Give your health care provider a list of all the medicines, herbs, non-prescription drugs, or dietary supplements you use. Also tell them if you smoke, drink alcohol, or use illegal drugs. Some items may interact with your medicine.  What should I watch for while using this medicine?  Tell your doctor or health care professional if your pain does not go away, if it gets worse, or if you have new or a different type of pain. You may develop tolerance to the medicine. Tolerance means that you will need a higher dose of the medication for pain relief. Tolerance is normal and is expected if you take this medicine for a long time.  Do not suddenly stop taking your medicine because you may develop a severe reaction. Your body becomes used to the medicine. This does NOT mean you are addicted. Addiction is a behavior related to getting and using a drug for a non-medical reason. If you have pain, you have a medical reason to take pain medicine. Your doctor will tell you how much medicine to take. If your doctor wants you to stop the medicine, the dose will be slowly lowered over time to avoid any side effects.  You may get drowsy or dizzy. Do not drive, use machinery, or do anything that needs mental alertness until you know how this medicine affects you. Do not stand or sit up quickly, especially if you are an older patient. This reduces the risk of dizzy or fainting spells. Alcohol may interfere with the effect of this medicine. Avoid alcoholic drinks.  There are different types of narcotic medicines (opiates) for pain. If you take more than one type at the same time, you may have more side effects. Give your health care provider a list of all medicines you use. Your doctor will tell you how much medicine to take. Do not take more medicine than directed. Call emergency for help if you have problems  breathing.  The medicine will cause constipation. Try to have a bowel movement at least every 2 to 3 days. If you do not have a bowel movement for 3 days, call your doctor or health care professional.  Do not take Tylenol (acetaminophen) or medicines that have acetaminophen with this medicine. Too much acetaminophen can be very dangerous. Many nonprescription medicines contain acetaminophen. Always read the labels carefully to avoid taking more acetaminophen.  What side effects may I notice from receiving this medicine?  Side effects that you should report to your doctor or health care professional as soon as possible:  -allergic reactions like skin rash, itching or hives, swelling of the face, lips, or tongue  -breathing difficulties, wheezing  -confusion  -light headedness or fainting spells  -severe stomach pain  -unusually weak or tired  -yellowing of the skin or the whites of the eyes  Side effects that usually do not require medical attention (report to your doctor or health care professional if they continue or are bothersome):  -dizziness  -drowsiness  -nausea  -vomiting  This list may not describe all possible side effects. Call your doctor for medical advice about side effects. You may report side effects to FDA at 1-462-FDA-5316.  Where should I keep my medicine?  Keep out of the reach of children. This medicine can be abused. Keep your medicine in a safe place to protect it from theft. Do not share this medicine with anyone. Selling or giving away this medicine is dangerous and against the law.  Store at room temperature between 20 and 25 degrees C (68 and 77 degrees F). Keep container tightly closed. Protect from light.  This medicine may cause accidental overdose and death if it is taken by other adults, children, or pets. Flush any unused medicine down the toilet to reduce the chance of harm. Do not use the medicine after the expiration date.  NOTE: This sheet is a summary. It may not cover all  possible information. If you have questions about this medicine, talk to your doctor, pharmacist, or health care provider.     © 2015, [employer]. (2014-08-11 13:17:35)

## 2018-02-09 NOTE — PLAN OF CARE
Problem: Perioperative Period (Adult)  Goal: Signs and Symptoms of Listed Potential Problems Will be Absent or Manageable (Perioperative Period)  Outcome: Ongoing (interventions implemented as appropriate)   02/09/18 0649   Perioperative Period   Problems Assessed (Perioperative Period) all   Problems Present (Perioperative Period) none

## 2018-02-09 NOTE — PLAN OF CARE
Problem: Perioperative Period (Adult)  Goal: Signs and Symptoms of Listed Potential Problems Will be Absent or Manageable (Perioperative Period)  Outcome: Outcome(s) achieved Date Met: 02/09/18 02/09/18 1035   Perioperative Period   Problems Present (Perioperative Period) pain  (Headache.)

## 2018-02-10 NOTE — OP NOTE
Procedure Note    Elena Siddiqui  2/9/2018    Pre-op Diagnosis:   1.  Recurrent left second hammertoe       Post-Op Diagnosis Codes:  Same    Procedure:  1.  Revision left second hammertoe correction  2.  Correction of angular deformity, left second metatarsophalangeal joint    Surgeon(s):  Miles Brown Jr., MD    Anesthesia: Regional plus sedation    Estimated Blood Loss: 5cc    Specimens:  None      Drains: None    Complications: None apparent    Disposition: Stable to PACU for recovery    Indications for procedure:  The patient is a pleasant 71 y/o female who underwent comprehensive left forefoot reconstruction approximately 6 months ago.  She had a severe second hammertoe/crossover toe preoperatively.  In postoperative recovery, the second toe was ischemic and the K-wire had to be removed immediately to prevent toe necrosis. She developed moderate recurrent flexion deformity at the second toe.  She requested revision/correction.  The risks/benefits of surgery, including pain, infection, wound healing problems, need for future procedures, mal/nonunion, DVT/PE, cardiac event, and/or death were discussed, and the patient elected to proceed with surgery.    Procedure in detail:  The correct patient was identified in preoperative holding.  All risks and benefits of surgery were again discussed in detail, and the patient agreed to proceed with surgery.  The operative extremity was confirmed and marked by myself.  Operative consent reviewed and confirmed to be signed by the patient and myself.    At this time, the patient was wheeled to the operative theatre and placed supine on the OR table.  VLADIMIR/SCD placed on nonoperative leg. Anesthesia was induced smoothly by our anesthesia colleagues.   The left lower extremity was prepped and draped in standard sterile fashion.  Appropriate presurgical timeout was performed, confirming correct patient, correct extremity, correct procedure, availability of sterile  instruments/implants, and the administration of intravenous antibiotics in the form of Ancef within one hour of skin incision.    The extremity was exsanguinated with an Esmarch tourniquet.  Her prior dorsal longitudinal incision was reopened centered between the second and third metatarsal heads.  Dissection was carried down bluntly to the second metatarsophalangeal joint.  Standard T-shaped capsulotomy was performed accessing the metatarsophalangeal joint.  Appropriate collateral ligament release and soft tissue balancing was performed to correct angular deformity at the joint.  The prior Weil osteotomy snapoff screw was encountered and found to have excellent purchase.  The osteotomy appeared well healed.    Attention was then turned to the second toe.  Her prior elliptical incision was made centered over the subluxed PIP joint.  Collaterals were released in appropriate fashion to gain access to the joint.  Soft tissue retractors were used to protect all neurovascular structures.  The distal aspect of the proximal phalanx was transected using the microsagittal saw.  A rongeur was then used to prepare the base of the middle phalanx.  A 0.062 K wire was then advanced in anterograde and then retrograde fashion, with excellent apposition at the intended PIP arthrodesis site.  The toe was inspected clinically.  All sagittal and coronal plane deformity had been appropriately corrected.    At this time, the forefoot was examined clinically.  All deformity had been appropriately corrected.  Fluoroscopy was used in AP, oblique, lateral planes to confirm appropriate placement of the K wire with good apposition at the PIP arthrodesis site.    The wires were clipped close the toe and Robbie balls applied.    Wounds were copiously irrigated with sterile saline.  Tourniquet let down.  Hemostasis achieved.  Brisk capillary refill returned to the second toe which pinked up quickly.  The dorsal foot incision was closed with 3-0  "Vicryl in buried interrupted fashion and then 3-0 nylon on the skin.  The hammertoe incision was closed with 3-0 nylon.  Xeroform, sterile gauze, and a well-padded soft dressing were applied.  Drapes were taken down.  The patient was awoken from anesthesia without apparent complication and taken to PACU for recovery.    Postoperative Plan:  Weightbearing status: Heel WB in postop shoe  DVT Prophylaxis: No formal chemoprophylaxis for low risk ambulatory patient;  Patient will be instructed to elevate as much as possible (\"toes above the nose\") and to get up and move around at least once per hour while awake to help minimize risk of blood clots.        Miles Brown Jr, MD     Date: 2/9/2018  Time: 9:02 PM        "

## 2019-07-08 ENCOUNTER — HOSPITAL ENCOUNTER (EMERGENCY)
Facility: HOSPITAL | Age: 74
Discharge: HOME | End: 2019-07-08
Attending: EMERGENCY MEDICINE | Admitting: EMERGENCY MEDICINE

## 2019-07-08 ENCOUNTER — APPOINTMENT (OUTPATIENT)
Dept: CT IMAGING | Facility: HOSPITAL | Age: 74
End: 2019-07-08

## 2019-07-08 ENCOUNTER — APPOINTMENT (OUTPATIENT)
Dept: GENERAL RADIOLOGY | Facility: HOSPITAL | Age: 74
End: 2019-07-08

## 2019-07-08 VITALS
SYSTOLIC BLOOD PRESSURE: 164 MMHG | WEIGHT: 185 LBS | HEART RATE: 92 BPM | OXYGEN SATURATION: 93 % | BODY MASS INDEX: 29.73 KG/M2 | HEIGHT: 66 IN | TEMPERATURE: 98.6 F | DIASTOLIC BLOOD PRESSURE: 87 MMHG | RESPIRATION RATE: 18 BRPM

## 2019-07-08 DIAGNOSIS — S52.611A CLOSED DISPLACED FRACTURE OF STYLOID PROCESS OF RIGHT ULNA, INITIAL ENCOUNTER: ICD-10-CM

## 2019-07-08 DIAGNOSIS — S52.571A OTHER CLOSED INTRA-ARTICULAR FRACTURE OF DISTAL END OF RIGHT RADIUS, INITIAL ENCOUNTER: Primary | ICD-10-CM

## 2019-07-08 LAB
ANION GAP SERPL CALCULATED.3IONS-SCNC: 16.1 MMOL/L (ref 5–15)
BASOPHILS # BLD AUTO: 0.04 10*3/MM3 (ref 0–0.2)
BASOPHILS NFR BLD AUTO: 0.4 % (ref 0–1.5)
BUN BLD-MCNC: 15 MG/DL (ref 8–23)
BUN/CREAT SERPL: 15 (ref 7–25)
CALCIUM SPEC-SCNC: 8.9 MG/DL (ref 8.6–10.5)
CHLORIDE SERPL-SCNC: 97 MMOL/L (ref 98–107)
CO2 SERPL-SCNC: 20.9 MMOL/L (ref 22–29)
CREAT BLD-MCNC: 1 MG/DL (ref 0.57–1)
DEPRECATED RDW RBC AUTO: 45.6 FL (ref 37–54)
EOSINOPHIL # BLD AUTO: 0.12 10*3/MM3 (ref 0–0.4)
EOSINOPHIL NFR BLD AUTO: 1.3 % (ref 0.3–6.2)
ERYTHROCYTE [DISTWIDTH] IN BLOOD BY AUTOMATED COUNT: 13.9 % (ref 12.3–15.4)
GFR SERPL CREATININE-BSD FRML MDRD: 54 ML/MIN/1.73
GLUCOSE BLD-MCNC: 110 MG/DL (ref 65–99)
HCT VFR BLD AUTO: 43.4 % (ref 34–46.6)
HGB BLD-MCNC: 14.5 G/DL (ref 12–15.9)
IMM GRANULOCYTES # BLD AUTO: 0.04 10*3/MM3 (ref 0–0.05)
IMM GRANULOCYTES NFR BLD AUTO: 0.4 % (ref 0–0.5)
LYMPHOCYTES # BLD AUTO: 1.34 10*3/MM3 (ref 0.7–3.1)
LYMPHOCYTES NFR BLD AUTO: 14.1 % (ref 19.6–45.3)
MCH RBC QN AUTO: 29.9 PG (ref 26.6–33)
MCHC RBC AUTO-ENTMCNC: 33.4 G/DL (ref 31.5–35.7)
MCV RBC AUTO: 89.5 FL (ref 79–97)
MONOCYTES # BLD AUTO: 0.96 10*3/MM3 (ref 0.1–0.9)
MONOCYTES NFR BLD AUTO: 10.1 % (ref 5–12)
NEUTROPHILS # BLD AUTO: 7.03 10*3/MM3 (ref 1.7–7)
NEUTROPHILS NFR BLD AUTO: 73.7 % (ref 42.7–76)
NRBC BLD AUTO-RTO: 0 /100 WBC (ref 0–0.2)
PLATELET # BLD AUTO: 256 10*3/MM3 (ref 140–450)
PMV BLD AUTO: 10 FL (ref 6–12)
POTASSIUM BLD-SCNC: 3.9 MMOL/L (ref 3.5–5.2)
RBC # BLD AUTO: 4.85 10*6/MM3 (ref 3.77–5.28)
SODIUM BLD-SCNC: 134 MMOL/L (ref 136–145)
WBC NRBC COR # BLD: 9.53 10*3/MM3 (ref 3.4–10.8)

## 2019-07-08 PROCEDURE — 80048 BASIC METABOLIC PNL TOTAL CA: CPT | Performed by: EMERGENCY MEDICINE

## 2019-07-08 PROCEDURE — 25010000002 KETOROLAC TROMETHAMINE PER 15 MG: Performed by: EMERGENCY MEDICINE

## 2019-07-08 PROCEDURE — 85025 COMPLETE CBC W/AUTO DIFF WBC: CPT | Performed by: EMERGENCY MEDICINE

## 2019-07-08 PROCEDURE — 25010000002 MORPHINE PER 10 MG: Performed by: EMERGENCY MEDICINE

## 2019-07-08 PROCEDURE — 70450 CT HEAD/BRAIN W/O DYE: CPT

## 2019-07-08 PROCEDURE — 73110 X-RAY EXAM OF WRIST: CPT

## 2019-07-08 PROCEDURE — 99284 EMERGENCY DEPT VISIT MOD MDM: CPT

## 2019-07-08 PROCEDURE — 96375 TX/PRO/DX INJ NEW DRUG ADDON: CPT

## 2019-07-08 PROCEDURE — 96376 TX/PRO/DX INJ SAME DRUG ADON: CPT

## 2019-07-08 PROCEDURE — 96374 THER/PROPH/DIAG INJ IV PUSH: CPT

## 2019-07-08 PROCEDURE — 25010000002 ONDANSETRON PER 1 MG: Performed by: EMERGENCY MEDICINE

## 2019-07-08 RX ORDER — ONDANSETRON 2 MG/ML
4 INJECTION INTRAMUSCULAR; INTRAVENOUS ONCE
Status: COMPLETED | OUTPATIENT
Start: 2019-07-08 | End: 2019-07-08

## 2019-07-08 RX ORDER — LIDOCAINE HYDROCHLORIDE 10 MG/ML
10 INJECTION, SOLUTION EPIDURAL; INFILTRATION; INTRACAUDAL; PERINEURAL ONCE
Status: DISCONTINUED | OUTPATIENT
Start: 2019-07-08 | End: 2019-07-08

## 2019-07-08 RX ORDER — OXYCODONE HYDROCHLORIDE AND ACETAMINOPHEN 5; 325 MG/1; MG/1
1 TABLET ORAL ONCE
Status: COMPLETED | OUTPATIENT
Start: 2019-07-08 | End: 2019-07-08

## 2019-07-08 RX ORDER — ONDANSETRON 4 MG/1
4 TABLET, ORALLY DISINTEGRATING ORAL EVERY 8 HOURS PRN
Qty: 10 TABLET | Refills: 0 | Status: SHIPPED | OUTPATIENT
Start: 2019-07-08 | End: 2019-07-11

## 2019-07-08 RX ORDER — MORPHINE SULFATE 2 MG/ML
2 INJECTION, SOLUTION INTRAMUSCULAR; INTRAVENOUS ONCE
Status: COMPLETED | OUTPATIENT
Start: 2019-07-08 | End: 2019-07-08

## 2019-07-08 RX ORDER — SENNA AND DOCUSATE SODIUM 50; 8.6 MG/1; MG/1
1 TABLET, FILM COATED ORAL DAILY
Qty: 30 TABLET | Refills: 0 | Status: SHIPPED | OUTPATIENT
Start: 2019-07-08 | End: 2019-07-12

## 2019-07-08 RX ORDER — MORPHINE SULFATE 2 MG/ML
4 INJECTION, SOLUTION INTRAMUSCULAR; INTRAVENOUS ONCE
Status: COMPLETED | OUTPATIENT
Start: 2019-07-08 | End: 2019-07-08

## 2019-07-08 RX ORDER — KETOROLAC TROMETHAMINE 30 MG/ML
15 INJECTION, SOLUTION INTRAMUSCULAR; INTRAVENOUS ONCE
Status: COMPLETED | OUTPATIENT
Start: 2019-07-08 | End: 2019-07-08

## 2019-07-08 RX ORDER — HYDROCODONE BITARTRATE AND ACETAMINOPHEN 5; 325 MG/1; MG/1
1 TABLET ORAL EVERY 6 HOURS PRN
Qty: 12 TABLET | Refills: 0 | Status: SHIPPED | OUTPATIENT
Start: 2019-07-08 | End: 2019-07-12

## 2019-07-08 RX ADMIN — KETOROLAC TROMETHAMINE 15 MG: 30 INJECTION, SOLUTION INTRAMUSCULAR at 21:58

## 2019-07-08 RX ADMIN — ONDANSETRON HYDROCHLORIDE 4 MG: 2 SOLUTION INTRAMUSCULAR; INTRAVENOUS at 21:58

## 2019-07-08 RX ADMIN — MORPHINE SULFATE 2 MG: 2 INJECTION, SOLUTION INTRAMUSCULAR; INTRAVENOUS at 21:10

## 2019-07-08 RX ADMIN — MORPHINE SULFATE 4 MG: 2 INJECTION, SOLUTION INTRAMUSCULAR; INTRAVENOUS at 20:31

## 2019-07-08 RX ADMIN — ONDANSETRON HYDROCHLORIDE 4 MG: 2 SOLUTION INTRAMUSCULAR; INTRAVENOUS at 20:47

## 2019-07-08 RX ADMIN — OXYCODONE HYDROCHLORIDE AND ACETAMINOPHEN 1 TABLET: 5; 325 TABLET ORAL at 21:11

## 2019-07-08 NOTE — ED TRIAGE NOTES
To ER via PV. States fell getting up from chair.  C/o rt wrist pain with obvious deformity. + radial pulse, good cap refill.  Also has hematoma to rt side of forehead.

## 2019-07-09 NOTE — ED PROVIDER NOTES
EMERGENCY DEPARTMENT ENCOUNTER    CHIEF COMPLAINT  Chief Complaint: wrist pain  History given by: patient  History limited by: none  Room Number: 34/34  PMD: Duke Carson MD      HPI:  Pt is a 74 y.o. female who presents complaining of R wrist pain and deformity w/ head pain s/p fall. Pt advised she was standing up from a chair when she lost her balance and landed on her R wrist. She denies any numbness or tingling to the hand. She denies LOC, n/v, vision complaints, neck pain, back pain, and any other injuries or complaints.   PAST MEDICAL HISTORY  Active Ambulatory Problems     Diagnosis Date Noted   • Visual changes 05/12/2016   • Headache 05/13/2016   • Aneurysm (CMS/HCC) 06/03/2016   • S/P ORIF (open reduction internal fixation) fracture 12/30/2016   • Humerus fracture 12/31/2016   • Abnormal brain MRI 01/24/2018     Resolved Ambulatory Problems     Diagnosis Date Noted   • No Resolved Ambulatory Problems     Past Medical History:   Diagnosis Date   • Aneurysm (CMS/HCC)    • Depression    • Frequent falls    • Frequent UTI    • Hammer toe of left foot    • Hypercholesteremia    • Hypertension    • PONV (postoperative nausea and vomiting)        PAST SURGICAL HISTORY  Past Surgical History:   Procedure Laterality Date   • BUNIONECTOMY Left    • HAMMER TOE REPAIR Left 2/9/2018    Procedure: LEFT 2ND HAMMER TOE CORRECTION  CORRECTION OF ANGULAR DEFORMITY;  Surgeon: Miles Brown Jr., MD;  [place] [facility] [place] [facility] [place]  Service:    • HIP ARTHROPLASTY Left    • KNEE ARTHROPLASTY Bilateral    • ORIF HUMERUS FRACTURE Left 12/30/2016    Procedure: LEFT PROXIMAL HUMERUS  OPEN REDUCTION  INTERNAL FIXATION ;  Surgeon: Alfonso Troy MD;  Location[place] [facility] [place] [facility] [place];  Service:    • OTHER SURGICAL HISTORY      Arm surgery (left)   • TOE SURGERY Left     hammer toe       FAMILY HISTORY  Family History   Problem Relation Age of Onset   • Diabetes Paternal Grandmother    • Malig Hyperthermia Neg Hx        SOCIAL  HISTORY  Social History     Socioeconomic History   • Marital status:      Spouse name: Not on file   • Number of children: Not on file   • Years of education: Not on file   • Highest education level: Not on file   Occupational History   • Occupation: retired   Tobacco Use   • Smoking status: Never Smoker   • Smokeless tobacco: Never Used   Substance and Sexual Activity   • Alcohol use: Yes     Comment: Rarely, special occasions    • Drug use: No       ALLERGIES  Levofloxacin and Sulfa antibiotics    REVIEW OF SYSTEMS  Review of Systems   Constitutional: Negative for chills and fever.   Respiratory: Negative for shortness of breath.    Cardiovascular: Negative for chest pain.   Gastrointestinal: Negative for nausea and vomiting.   Musculoskeletal:        + R wrist pain   Neurological: Negative for weakness and numbness.       PHYSICAL EXAM  ED Triage Vitals [07/08/19 2000]   Temp Heart Rate Resp BP SpO2   98.6 °F (37 °C) 111 18 -- 98 %      Temp src Heart Rate Source Patient Position BP Location FiO2 (%)   Tympanic -- -- -- --       Physical Exam   Constitutional: She is oriented to person, place, and time and well-developed, well-nourished, and in no distress. No distress.   HENT:   Mouth/Throat: Mucous membranes are normal.   Right forehead hematoma.   Eyes: EOM are normal.   Neck: Normal range of motion.   No cpsine tenderness.   Cardiovascular: Normal rate and regular rhythm. Exam reveals no gallop and no friction rub.   No murmur heard.  Pulmonary/Chest: Effort normal. No respiratory distress. She has no wheezes. She has no rhonchi. She has no rales.   Abdominal: Soft. She exhibits no distension. There is no tenderness. There is no guarding.   Musculoskeletal: Normal range of motion. She exhibits no deformity.   R wrist: edema and deformity with slight radial deviation and tender to palpation, radial pulses intake, nml capillary refill, no elbow or shoulder pain, moves all digits in the hand    Neurological: She is alert and oriented to person, place, and time.   moving all extremities, no focal deficits   Skin: Skin is warm and dry.   Psychiatric:   Calm, cooperative       LAB RESULTS  Lab Results (last 24 hours)     Procedure Component Value Units Date/Time    CBC & Differential [[id number]] Collected:  07/08/19 2038    Specimen:  Blood Updated:  07/08/19 2049    Narrative:       The following orders were created for panel order CBC & Differential.  Procedure                               Abnormality         Status                     ---------                               -----------         ------                     CBC Auto Differential[[id number]]        Abnormal            Final result                 Please view results for these tests on the individual orders.    Basic Metabolic Panel [[id number]]  (Abnormal) Collected:  07/08/19 2038    Specimen:  Blood from Arm, Left Updated:  07/08/19 2110     Glucose 110 mg/dL      BUN 15 mg/dL      Creatinine 1.00 mg/dL      Sodium 134 mmol/L      Potassium 3.9 mmol/L      Chloride 97 mmol/L      CO2 20.9 mmol/L      Calcium 8.9 mg/dL      eGFR Non African Amer 54 mL/min/1.73      BUN/Creatinine Ratio 15.0     Anion Gap 16.1 mmol/L     Narrative:       GFR Normal >60  Chronic Kidney Disease <60  Kidney Failure <15    CBC Auto Differential [[id number]]  (Abnormal) Collected:  07/08/19 2038    Specimen:  Blood from Arm, Left Updated:  07/08/19 2049     WBC 9.53 10*3/mm3      RBC 4.85 10*6/mm3      Hemoglobin 14.5 g/dL      Hematocrit 43.4 %      MCV 89.5 fL      MCH 29.9 pg      MCHC 33.4 g/dL      RDW 13.9 %      RDW-SD 45.6 fl      MPV 10.0 fL      Platelets 256 10*3/mm3      Neutrophil % 73.7 %      Lymphocyte % 14.1 %      Monocyte % 10.1 %      Eosinophil % 1.3 %      Basophil % 0.4 %      Immature Grans % 0.4 %      Neutrophils, Absolute 7.03 10*3/mm3      Lymphocytes, Absolute 1.34 10*3/mm3      Monocytes, Absolute 0.96 10*3/mm3      Eosinophils,  Absolute 0.12 10*3/mm3      Basophils, Absolute 0.04 10*3/mm3      Immature Grans, Absolute 0.04 10*3/mm3      nRBC 0.0 /100 WBC           I ordered the above labs and reviewed the results    RADIOLOGY  CT Head Without Contrast   Final Result   1. No acute intracranial abnormality.                       This report was finalized on 7/8/2019 10:01 PM by Octavio Valente M.D.          XR Wrist 3+ View Right   Final Result   FINDINGS AND IMPRESSION:   There is a comminuted, displaced and dorsally angulated intra-articular   distal radial fracture. Displaced ulnar styloid fractures also present.   Moderate to severe osteoarthritis of the first carpometacarpal joint is   present with proximal migration of the first metacarpal.       This report was finalized on 7/8/2019 8:46 PM by Dr. Alex Stone M.D.               I ordered the above noted radiological studies. Interpreted by radiologist. Reviewed by me in PACS.       PROCEDURES  Procedures      PROGRESS AND CONSULTS  ED Course as of Jul 08 2245   Mon Jul 08, 2019 2106 Sugar tong splint placed on right arm for wrist immobilization.   [DC]   2107 Patient requesting more pain meds, 2mg IV morphine and Percocet 5 ordered  [DC]      ED Course User Index  [DC] Liu Salter MD         MEDICAL DECISION MAKING  Results were reviewed/discussed with the patient and they were also made aware of online access. Pt also made aware that some labs, such as cultures, will not be resulted during ER visit and follow up with PMD is necessary.     MDM  Number of Diagnoses or Management Options  Closed displaced fracture of styloid process of right ulna, initial encounter:   Other closed intra-articular fracture of distal end of right radius, initial encounter:   Diagnosis management comments: Wrist fracture as noted, was placed into an ulnar gutter splint with sling for comfort, given pain medications and will be given orthopedic follow-up with instructions to call the office first  thing in the morning for close follow-up establishment.  Has been advised to return to the emergency part for worsening symptoms as needed.  Patient with no acute neurovascular issues at this time.       Amount and/or Complexity of Data Reviewed  Tests in the radiology section of CPT®: reviewed and ordered  Decide to obtain previous medical records or to obtain history from someone other than the patient: yes  Independent visualization of images, tracings, or specimens: yes    Patient Progress  Patient progress: stable         DIAGNOSIS  Final diagnoses:   Other closed intra-articular fracture of distal end of right radius, initial encounter   Closed displaced fracture of styloid process of right ulna, initial encounter       DISPOSITION  DISCHARGE    Patient discharged in stable condition.    Reviewed implications of results, diagnosis, meds, responsibility to follow up, warning signs and symptoms of possible worsening, potential complications and reasons to return to ER.    Patient/Family voiced understanding of above instructions.    Discussed plan for discharge, as there is no emergent indication for admission. Patient referred to primary care provider for BP management due to today's BP. Pt/family is agreeable and understands need for follow up and repeat testing.  Pt is aware that discharge does not mean that nothing is wrong but it indicates no emergency is present that requires admission and they must continue care with follow-up as given below or physician of their choice.     FOLLOW-UP  [facility] Emergency Department  [address]  [facility] [address] [phone]    As needed, If symptoms worsen    Raegan Mcgee MD  [address]  [place] [id number] [phone]    Call on 7/9/2019  first thing in the AM    Duke Carson MD  [address] [address] [phone]    Schedule an appointment as soon as possible for a visit    As needed         Medication List      New Prescriptions    HYDROcodone-acetaminophen 5-325 MG per tablet  Commonly known as:  NORCO  Take 1 tablet by mouth Every 6 (Six) Hours As Needed for Severe Pain . Do   not drive or mix with alcohol     ondansetron ODT 4 MG disintegrating tablet  Commonly known as:  ZOFRAN-ODT  Take 1 tablet by mouth Every 8 (Eight) Hours As Needed for Nausea or   Vomiting for up to 3 days.     sennosides-docusate sodium 8.6-50 MG tablet  Commonly known as:  SENOKOT-S  Take 1 tablet by mouth Daily. Drink plenty of water            Latest Documented Vital Signs:  As of 10:45 PM  BP- 164/87 HR- 92 Temp- 98.6 °F (37 °C) (Tympanic) O2 sat- 93%    --  Documentation assistance provided by sarath Tran for Dr. Salter.  Information recorded by the scribe was done at my direction and has been verified and validated by me.     Tiffanie Tran  07/08/19 2021       Liu Salter MD  07/08/19 4739

## 2019-07-09 NOTE — DISCHARGE INSTRUCTIONS
Follow-up with orthopedics first thing in the morning to establish close follow-up.  Take the pain medications as prescribed, return to the emergency department for worsening symptoms as needed.

## 2019-07-09 NOTE — ED PROVIDER NOTES
Splint Application:  Splint Type: sugar tong splint  Indication: R wrist fx  Splint placed by Dr. Salter and myself  Post splint application:   1) neurovascularly intact   2) good position  Discussed splint care with patient  Discussed PMD/orthopedic follow up    Documentation assistance provided by sarath Reid for Lyric Manuel ([employer]).  Information recorded by the scribe was done at my direction and has been verified and validated by me.       Igor Reid  07/08/19 1687       Lyric Manuel PA  07/08/19 0997

## 2019-07-10 ENCOUNTER — PREP FOR SURGERY (OUTPATIENT)
Dept: OTHER | Facility: HOSPITAL | Age: 74
End: 2019-07-10

## 2019-07-10 DIAGNOSIS — S52.601A CLOSED FRACTURE OF LOWER END OF RADIUS AND ULNA, RIGHT, INITIAL ENCOUNTER: Primary | ICD-10-CM

## 2019-07-10 DIAGNOSIS — S52.501A CLOSED FRACTURE OF LOWER END OF RADIUS AND ULNA, RIGHT, INITIAL ENCOUNTER: Primary | ICD-10-CM

## 2019-07-10 RX ORDER — CEFAZOLIN SODIUM 2 G/100ML
2 INJECTION, SOLUTION INTRAVENOUS ONCE
Status: CANCELLED | OUTPATIENT
Start: 2019-07-13 | End: 2019-07-10

## 2019-07-10 NOTE — H&P
BERTIN TAVERAS is a pleasant right-hand-dominant individual who presents to the office today for evaluation, management of her RIGHT wrist fracture.  She fell getting out of her chair at home on July 8, 2019.  She noticed immediate onset of sudden pain associated with swelling and deformity.  She presented to [facility] emergency room, was placed into a sugar tong splint after evaluation with x-rays.  Was advised to follow-up with her orthopedic surgeon.  She is known to our office in the past.  She is accompanied by her daughter-in-law to the office visit.  She was given hydrocodone at the time of discharge from the emergency room.  She has not been using it.  She is currently taking ibuprofen.  She denies any tingling, numbness of the fingers.  She has been seen by cardiologist, Dr. Harry in the past about 1 year back.  No abnormality is detected according to the patient.  She also has a history of a LEFT distal radius fracture that was managed with open reduction internal fixation several years back.  Review of Systems:  Positive for: Depression, Headaches and Night Sweats.    Patient denies: Abdominal Pain, Bleeding, Chest Pain, Convulsions/Seizure, Decreased Motion, Difficulty Swallowing, Easy Bruisability, Emotional Disturbances, Eyes or Vision Problems, Fecal Incontinence, Fever/Chills, Increased Thirst, Increased Hunger, Insomnia, Joint Pain, Nausea/Vomiting, Poor Balance, Persistent Cough, Rash, Shortness of Breath, Shortness of Breath While Lying down, Skin Problems, Urinary Retention and Weakness.  Allergies:  Sulfa (critical)  * metal - negative  Medications:  * atorvastatin   * lisinopril   * pain meds   Patient History of:  MIGRAINES  DEPRESSION  HIGH CHOLESTEROL  BLOOD CLOTS/EMBOLISM - NEGATIVE  HYPERTENSION  Surgical History:  right Total Knee Arthroplasty-[CPT-01354]   left Total Hip Arthroplasty-[CPT-68629]   left Total Knee Arthroplasty-[CPT-34649]   Known Family History of:  heart  disease-father  Social History:  Social history taken on 07/10/2019 states DARCY MOSER is a  74 year old female.  She has never used tobacco products.  She has fallen more than once or has been injured because of the fall that occured in the last 12 months.      Past medical, social, family histories and ROS reviewed today with the patient and changes documented in the chart (07/10/2019).  PCP Dr. TINY RODRIGUEZ, ARAM RUIZ    Physical Exam  Height:  65 in.    Weight:  188.6 lbs.     BMI:  31.50  Pulse:  93  Blood pressure:  148 / 80 mm Hg  Mental/HEENT/Cardio/Skin  The patient's general appearance was well nourished, well hydrated, no acute distress.  Orientation was alert and oriented x 3.  The patient's mood was normal.  Pulmonary exam shows normal air exchange, no labored breathing, or shortness of breath.  A skin exam showed normal temperature and color in the area of examination.      Right Wrist/Hand  In a sugar tong splint.  She has positive swelling over the dorsum of the fingers and hand.  She is able to do gentle active range of motion of the fingers.  Good capillary refill.  No sign of carpal tunnel syndrome.    Imaging/Diagnostic Studies    X-rays of the right wrist/hand were taken on 07/08/2019 [facility].  X-rays show the patient has a(n) distal radius fracture.  The alignment is displaced, angulated.  The fracture type is comminuted and intrarticular.  A dorsal tilt and loss of height was noted.    Impression  Right radius distal fracture ([id number])    Plan  Patient does not use tobacco.    The patient has a displaced intra-articular fracture involving the RIGHT distal radius along with an ulnar styloid process fracture..    Options were discussed including nonoperative management versus surgical intervention.    The patient is indicated for open reduction and internal fixation of RIGHT distal radius.    The likely risks and benefits of the procedure including but not limited to  infection, DVT, pulmonary embolism, malunion, nonunion, post traumatic stiffness, post traumatic arthritis, possibility of injury to nerves, vessels, tendons have been discussed in detail with the patient and her family.  Despite the risks involved the patient would like to proceed.    The surgery is scheduled for.  Open reduction internal fixation of RIGHT distal radius fracture at [facility] on July 13, 2019.  Surgery will be planned for outpatient procedure.    We discussed the benefits of surgical intervention, as well as alternative treatments.  Potential surgical risks and complications include but are not limited to expected outcomes and the risk that the procedure may not accomplish the desired objective.  Sufficient opportunity was given to discuss the condition and treatment plan with the doctor, and all questions were answered for the patient.  The discussion lasted 20 minutes.      Darcy should follow up with DEVORA HOANG MD post op.  Before being seen, DARCY will have an X-ray in our office of the Right Wrist [Lateral;PA].

## 2019-07-10 NOTE — H&P (VIEW-ONLY)
BERTIN TAVERAS is a pleasant right-hand-dominant individual who presents to the office today for evaluation, management of her RIGHT wrist fracture.  She fell getting out of her chair at home on July 8, 2019.  She noticed immediate onset of sudden pain associated with swelling and deformity.  She presented to [facility] emergency room, was placed into a sugar tong splint after evaluation with x-rays.  Was advised to follow-up with her orthopedic surgeon.  She is known to our office in the past.  She is accompanied by her daughter-in-law to the office visit.  She was given hydrocodone at the time of discharge from the emergency room.  She has not been using it.  She is currently taking ibuprofen.  She denies any tingling, numbness of the fingers.  She has been seen by cardiologist, Dr. Harry in the past about 1 year back.  No abnormality is detected according to the patient.  She also has a history of a LEFT distal radius fracture that was managed with open reduction internal fixation several years back.  Review of Systems:  Positive for: Depression, Headaches and Night Sweats.    Patient denies: Abdominal Pain, Bleeding, Chest Pain, Convulsions/Seizure, Decreased Motion, Difficulty Swallowing, Easy Bruisability, Emotional Disturbances, Eyes or Vision Problems, Fecal Incontinence, Fever/Chills, Increased Thirst, Increased Hunger, Insomnia, Joint Pain, Nausea/Vomiting, Poor Balance, Persistent Cough, Rash, Shortness of Breath, Shortness of Breath While Lying down, Skin Problems, Urinary Retention and Weakness.  Allergies:  Sulfa (critical)  * metal - negative  Medications:  * atorvastatin   * lisinopril   * pain meds   Patient History of:  MIGRAINES  DEPRESSION  HIGH CHOLESTEROL  BLOOD CLOTS/EMBOLISM - NEGATIVE  HYPERTENSION  Surgical History:  right Total Knee Arthroplasty-[CPT-72193]   left Total Hip Arthroplasty-[CPT-41011]   left Total Knee Arthroplasty-[CPT-73926]   Known Family History of:  heart  disease-father  Social History:  Social history taken on 07/10/2019 states DARCY MOSER is a  74 year old female.  She has never used tobacco products.  She has fallen more than once or has been injured because of the fall that occured in the last 12 months.      Past medical, social, family histories and ROS reviewed today with the patient and changes documented in the chart (07/10/2019).  PCP Dr. TINY RODRIGUEZ, ARAM RUIZ    Physical Exam  Height:  65 in.    Weight:  188.6 lbs.     BMI:  31.50  Pulse:  93  Blood pressure:  148 / 80 mm Hg  Mental/HEENT/Cardio/Skin  The patient's general appearance was well nourished, well hydrated, no acute distress.  Orientation was alert and oriented x 3.  The patient's mood was normal.  Pulmonary exam shows normal air exchange, no labored breathing, or shortness of breath.  A skin exam showed normal temperature and color in the area of examination.      Right Wrist/Hand  In a sugar tong splint.  She has positive swelling over the dorsum of the fingers and hand.  She is able to do gentle active range of motion of the fingers.  Good capillary refill.  No sign of carpal tunnel syndrome.    Imaging/Diagnostic Studies    X-rays of the right wrist/hand were taken on 07/08/2019 [facility].  X-rays show the patient has a(n) distal radius fracture.  The alignment is displaced, angulated.  The fracture type is comminuted and intrarticular.  A dorsal tilt and loss of height was noted.    Impression  Right radius distal fracture ([id number])    Plan  Patient does not use tobacco.    The patient has a displaced intra-articular fracture involving the RIGHT distal radius along with an ulnar styloid process fracture..    Options were discussed including nonoperative management versus surgical intervention.    The patient is indicated for open reduction and internal fixation of RIGHT distal radius.    The likely risks and benefits of the procedure including but not limited to  infection, DVT, pulmonary embolism, malunion, nonunion, post traumatic stiffness, post traumatic arthritis, possibility of injury to nerves, vessels, tendons have been discussed in detail with the patient and her family.  Despite the risks involved the patient would like to proceed.    The surgery is scheduled for.  Open reduction internal fixation of RIGHT distal radius fracture at [facility] on July 13, 2019.  Surgery will be planned for outpatient procedure.    We discussed the benefits of surgical intervention, as well as alternative treatments.  Potential surgical risks and complications include but are not limited to expected outcomes and the risk that the procedure may not accomplish the desired objective.  Sufficient opportunity was given to discuss the condition and treatment plan with the doctor, and all questions were answered for the patient.  The discussion lasted 20 minutes.      Darcy should follow up with DEVORA HOANG MD post op.  Before being seen, DARCY will have an X-ray in our office of the Right Wrist [Lateral;PA].

## 2019-07-13 ENCOUNTER — APPOINTMENT (OUTPATIENT)
Dept: GENERAL RADIOLOGY | Facility: HOSPITAL | Age: 74
End: 2019-07-13

## 2019-07-13 ENCOUNTER — ANESTHESIA (OUTPATIENT)
Dept: PERIOP | Facility: HOSPITAL | Age: 74
End: 2019-07-13

## 2019-07-13 ENCOUNTER — HOSPITAL ENCOUNTER (OUTPATIENT)
Facility: HOSPITAL | Age: 74
Setting detail: HOSPITAL OUTPATIENT SURGERY
Discharge: HOME | End: 2019-07-13
Attending: ORTHOPAEDIC SURGERY | Admitting: ORTHOPAEDIC SURGERY

## 2019-07-13 ENCOUNTER — ANESTHESIA EVENT (OUTPATIENT)
Dept: PERIOP | Facility: HOSPITAL | Age: 74
End: 2019-07-13

## 2019-07-13 VITALS
OXYGEN SATURATION: 92 % | HEART RATE: 93 BPM | DIASTOLIC BLOOD PRESSURE: 85 MMHG | HEIGHT: 67 IN | BODY MASS INDEX: 29.25 KG/M2 | WEIGHT: 186.38 LBS | RESPIRATION RATE: 16 BRPM | SYSTOLIC BLOOD PRESSURE: 176 MMHG | TEMPERATURE: 97.7 F

## 2019-07-13 DIAGNOSIS — S52.501A CLOSED FRACTURE OF LOWER END OF RADIUS AND ULNA, RIGHT, INITIAL ENCOUNTER: ICD-10-CM

## 2019-07-13 DIAGNOSIS — S52.601A CLOSED FRACTURE OF LOWER END OF RADIUS AND ULNA, RIGHT, INITIAL ENCOUNTER: ICD-10-CM

## 2019-07-13 PROCEDURE — 25010000002 MIDAZOLAM PER 1 MG: Performed by: ANESTHESIOLOGY

## 2019-07-13 PROCEDURE — 93005 ELECTROCARDIOGRAM TRACING: CPT | Performed by: ORTHOPAEDIC SURGERY

## 2019-07-13 PROCEDURE — C1713 ANCHOR/SCREW BN/BN,TIS/BN: HCPCS | Performed by: ORTHOPAEDIC SURGERY

## 2019-07-13 PROCEDURE — 25010000002 FENTANYL CITRATE (PF) 100 MCG/2ML SOLUTION: Performed by: ANESTHESIOLOGY

## 2019-07-13 PROCEDURE — 25010000002 HYDRALAZINE PER 20 MG: Performed by: NURSE ANESTHETIST, CERTIFIED REGISTERED

## 2019-07-13 PROCEDURE — 25010000002 ONDANSETRON PER 1 MG: Performed by: NURSE ANESTHETIST, CERTIFIED REGISTERED

## 2019-07-13 PROCEDURE — 25010000002 PROPOFOL 10 MG/ML EMULSION: Performed by: NURSE ANESTHETIST, CERTIFIED REGISTERED

## 2019-07-13 PROCEDURE — 76000 FLUOROSCOPY <1 HR PHYS/QHP: CPT

## 2019-07-13 PROCEDURE — C1769 GUIDE WIRE: HCPCS | Performed by: ORTHOPAEDIC SURGERY

## 2019-07-13 PROCEDURE — 25010000002 KETOROLAC TROMETHAMINE PER 15 MG: Performed by: NURSE ANESTHETIST, CERTIFIED REGISTERED

## 2019-07-13 PROCEDURE — 25010000003 CEFAZOLIN IN DEXTROSE 2-4 GM/100ML-% SOLUTION: Performed by: ORTHOPAEDIC SURGERY

## 2019-07-13 PROCEDURE — 93010 ELECTROCARDIOGRAM REPORT: CPT | Performed by: INTERNAL MEDICINE

## 2019-07-13 PROCEDURE — 73100 X-RAY EXAM OF WRIST: CPT

## 2019-07-13 PROCEDURE — 25010000002 ROPIVACAINE PER 1 MG: Performed by: ANESTHESIOLOGY

## 2019-07-13 PROCEDURE — 25010000002 MEPIVACAINE PF 2 % SOLUTION 20 ML VIAL: Performed by: ANESTHESIOLOGY

## 2019-07-13 PROCEDURE — 73090 X-RAY EXAM OF FOREARM: CPT

## 2019-07-13 PROCEDURE — 25010000002 PROMETHAZINE PER 50 MG: Performed by: NURSE ANESTHETIST, CERTIFIED REGISTERED

## 2019-07-13 PROCEDURE — 25010000002 DEXAMETHASONE PER 1 MG: Performed by: NURSE ANESTHETIST, CERTIFIED REGISTERED

## 2019-07-13 DEVICE — IMPLANTABLE DEVICE: Type: IMPLANTABLE DEVICE | Site: WRIST | Status: FUNCTIONAL

## 2019-07-13 DEVICE — SCRW LK VA LP TI 2.4X16MM: Type: IMPLANTABLE DEVICE | Site: WRIST | Status: FUNCTIONAL

## 2019-07-13 DEVICE — PLT VOLR DIST/RAD NRW 3H RT: Type: IMPLANTABLE DEVICE | Site: WRIST | Status: FUNCTIONAL

## 2019-07-13 DEVICE — SCRW LK LP DPR 3.5X12: Type: IMPLANTABLE DEVICE | Site: WRIST | Status: FUNCTIONAL

## 2019-07-13 RX ORDER — KETOROLAC TROMETHAMINE 30 MG/ML
INJECTION, SOLUTION INTRAMUSCULAR; INTRAVENOUS AS NEEDED
Status: DISCONTINUED | OUTPATIENT
Start: 2019-07-13 | End: 2019-07-13 | Stop reason: SURG

## 2019-07-13 RX ORDER — EPHEDRINE SULFATE 50 MG/ML
5 INJECTION, SOLUTION INTRAVENOUS ONCE AS NEEDED
Status: DISCONTINUED | OUTPATIENT
Start: 2019-07-13 | End: 2019-07-13 | Stop reason: HOSPADM

## 2019-07-13 RX ORDER — DEXAMETHASONE SODIUM PHOSPHATE 10 MG/ML
INJECTION INTRAMUSCULAR; INTRAVENOUS AS NEEDED
Status: DISCONTINUED | OUTPATIENT
Start: 2019-07-13 | End: 2019-07-13 | Stop reason: SURG

## 2019-07-13 RX ORDER — LIDOCAINE HYDROCHLORIDE 10 MG/ML
0.5 INJECTION, SOLUTION EPIDURAL; INFILTRATION; INTRACAUDAL; PERINEURAL ONCE AS NEEDED
Status: DISCONTINUED | OUTPATIENT
Start: 2019-07-13 | End: 2019-07-13 | Stop reason: HOSPADM

## 2019-07-13 RX ORDER — SODIUM CHLORIDE 0.9 % (FLUSH) 0.9 %
1-10 SYRINGE (ML) INJECTION AS NEEDED
Status: DISCONTINUED | OUTPATIENT
Start: 2019-07-13 | End: 2019-07-13 | Stop reason: HOSPADM

## 2019-07-13 RX ORDER — MAGNESIUM HYDROXIDE 1200 MG/15ML
LIQUID ORAL AS NEEDED
Status: DISCONTINUED | OUTPATIENT
Start: 2019-07-13 | End: 2019-07-13 | Stop reason: HOSPADM

## 2019-07-13 RX ORDER — SODIUM CHLORIDE, SODIUM LACTATE, POTASSIUM CHLORIDE, CALCIUM CHLORIDE 600; 310; 30; 20 MG/100ML; MG/100ML; MG/100ML; MG/100ML
9 INJECTION, SOLUTION INTRAVENOUS CONTINUOUS
Status: DISCONTINUED | OUTPATIENT
Start: 2019-07-13 | End: 2019-07-13 | Stop reason: HOSPADM

## 2019-07-13 RX ORDER — IBUPROFEN 200 MG
200 TABLET ORAL EVERY 6 HOURS PRN
COMMUNITY

## 2019-07-13 RX ORDER — HYDROMORPHONE HYDROCHLORIDE 1 MG/ML
0.5 INJECTION, SOLUTION INTRAMUSCULAR; INTRAVENOUS; SUBCUTANEOUS
Status: DISCONTINUED | OUTPATIENT
Start: 2019-07-13 | End: 2019-07-13 | Stop reason: HOSPADM

## 2019-07-13 RX ORDER — FAMOTIDINE 10 MG/ML
20 INJECTION, SOLUTION INTRAVENOUS ONCE
Status: DISCONTINUED | OUTPATIENT
Start: 2019-07-13 | End: 2019-07-13 | Stop reason: HOSPADM

## 2019-07-13 RX ORDER — MIDAZOLAM HYDROCHLORIDE 1 MG/ML
1 INJECTION INTRAMUSCULAR; INTRAVENOUS
Status: DISCONTINUED | OUTPATIENT
Start: 2019-07-13 | End: 2019-07-13 | Stop reason: HOSPADM

## 2019-07-13 RX ORDER — GLYCOPYRROLATE 0.2 MG/ML
INJECTION INTRAMUSCULAR; INTRAVENOUS AS NEEDED
Status: DISCONTINUED | OUTPATIENT
Start: 2019-07-13 | End: 2019-07-13 | Stop reason: SURG

## 2019-07-13 RX ORDER — ROPIVACAINE HYDROCHLORIDE 5 MG/ML
INJECTION, SOLUTION EPIDURAL; INFILTRATION; PERINEURAL
Status: COMPLETED | OUTPATIENT
Start: 2019-07-13 | End: 2019-07-13

## 2019-07-13 RX ORDER — NALOXONE HCL 0.4 MG/ML
0.2 VIAL (ML) INJECTION AS NEEDED
Status: DISCONTINUED | OUTPATIENT
Start: 2019-07-13 | End: 2019-07-13 | Stop reason: HOSPADM

## 2019-07-13 RX ORDER — FENTANYL CITRATE 50 UG/ML
INJECTION, SOLUTION INTRAMUSCULAR; INTRAVENOUS
Status: COMPLETED
Start: 2019-07-13 | End: 2019-07-13

## 2019-07-13 RX ORDER — ONDANSETRON 2 MG/ML
4 INJECTION INTRAMUSCULAR; INTRAVENOUS ONCE AS NEEDED
Status: DISCONTINUED | OUTPATIENT
Start: 2019-07-13 | End: 2019-07-13 | Stop reason: HOSPADM

## 2019-07-13 RX ORDER — PROMETHAZINE HYDROCHLORIDE 25 MG/1
25 SUPPOSITORY RECTAL ONCE AS NEEDED
Status: DISCONTINUED | OUTPATIENT
Start: 2019-07-13 | End: 2019-07-13 | Stop reason: HOSPADM

## 2019-07-13 RX ORDER — HYDROCODONE BITARTRATE AND ACETAMINOPHEN 7.5; 325 MG/1; MG/1
1 TABLET ORAL ONCE AS NEEDED
Status: DISCONTINUED | OUTPATIENT
Start: 2019-07-13 | End: 2019-07-13 | Stop reason: HOSPADM

## 2019-07-13 RX ORDER — FENTANYL CITRATE 50 UG/ML
50 INJECTION, SOLUTION INTRAMUSCULAR; INTRAVENOUS
Status: DISCONTINUED | OUTPATIENT
Start: 2019-07-13 | End: 2019-07-13 | Stop reason: HOSPADM

## 2019-07-13 RX ORDER — OXYCODONE AND ACETAMINOPHEN 7.5; 325 MG/1; MG/1
1 TABLET ORAL ONCE AS NEEDED
Status: COMPLETED | OUTPATIENT
Start: 2019-07-13 | End: 2019-07-13

## 2019-07-13 RX ORDER — ACETAMINOPHEN 325 MG/1
650 TABLET ORAL ONCE AS NEEDED
Status: DISCONTINUED | OUTPATIENT
Start: 2019-07-13 | End: 2019-07-13 | Stop reason: HOSPADM

## 2019-07-13 RX ORDER — MIDAZOLAM HYDROCHLORIDE 1 MG/ML
2 INJECTION INTRAMUSCULAR; INTRAVENOUS
Status: DISCONTINUED | OUTPATIENT
Start: 2019-07-13 | End: 2019-07-13 | Stop reason: HOSPADM

## 2019-07-13 RX ORDER — PROPOFOL 10 MG/ML
VIAL (ML) INTRAVENOUS AS NEEDED
Status: DISCONTINUED | OUTPATIENT
Start: 2019-07-13 | End: 2019-07-13 | Stop reason: SURG

## 2019-07-13 RX ORDER — LIDOCAINE HYDROCHLORIDE 20 MG/ML
INJECTION, SOLUTION INFILTRATION; PERINEURAL AS NEEDED
Status: DISCONTINUED | OUTPATIENT
Start: 2019-07-13 | End: 2019-07-13 | Stop reason: SURG

## 2019-07-13 RX ORDER — PROMETHAZINE HYDROCHLORIDE 25 MG/ML
6.25 INJECTION, SOLUTION INTRAMUSCULAR; INTRAVENOUS
Status: DISCONTINUED | OUTPATIENT
Start: 2019-07-13 | End: 2019-07-13 | Stop reason: HOSPADM

## 2019-07-13 RX ORDER — FLUMAZENIL 0.1 MG/ML
0.2 INJECTION INTRAVENOUS AS NEEDED
Status: DISCONTINUED | OUTPATIENT
Start: 2019-07-13 | End: 2019-07-13 | Stop reason: HOSPADM

## 2019-07-13 RX ORDER — DIPHENHYDRAMINE HCL 25 MG
25 CAPSULE ORAL
Status: DISCONTINUED | OUTPATIENT
Start: 2019-07-13 | End: 2019-07-13 | Stop reason: HOSPADM

## 2019-07-13 RX ORDER — ASPIRIN 81 MG/1
81 TABLET, CHEWABLE ORAL DAILY
COMMUNITY

## 2019-07-13 RX ORDER — ONDANSETRON 2 MG/ML
INJECTION INTRAMUSCULAR; INTRAVENOUS AS NEEDED
Status: DISCONTINUED | OUTPATIENT
Start: 2019-07-13 | End: 2019-07-13 | Stop reason: SURG

## 2019-07-13 RX ORDER — CEFAZOLIN SODIUM 2 G/100ML
2 INJECTION, SOLUTION INTRAVENOUS ONCE
Status: COMPLETED | OUTPATIENT
Start: 2019-07-13 | End: 2019-07-13

## 2019-07-13 RX ORDER — PROMETHAZINE HYDROCHLORIDE 25 MG/ML
12.5 INJECTION, SOLUTION INTRAMUSCULAR; INTRAVENOUS ONCE AS NEEDED
Status: DISCONTINUED | OUTPATIENT
Start: 2019-07-13 | End: 2019-07-13 | Stop reason: HOSPADM

## 2019-07-13 RX ORDER — PROMETHAZINE HYDROCHLORIDE 25 MG/ML
INJECTION, SOLUTION INTRAMUSCULAR; INTRAVENOUS AS NEEDED
Status: DISCONTINUED | OUTPATIENT
Start: 2019-07-13 | End: 2019-07-13 | Stop reason: SURG

## 2019-07-13 RX ORDER — FAMOTIDINE 10 MG/ML
20 INJECTION, SOLUTION INTRAVENOUS ONCE
Status: COMPLETED | OUTPATIENT
Start: 2019-07-13 | End: 2019-07-13

## 2019-07-13 RX ORDER — SODIUM CHLORIDE, SODIUM LACTATE, POTASSIUM CHLORIDE, CALCIUM CHLORIDE 600; 310; 30; 20 MG/100ML; MG/100ML; MG/100ML; MG/100ML
INJECTION, SOLUTION INTRAVENOUS CONTINUOUS PRN
Status: DISCONTINUED | OUTPATIENT
Start: 2019-07-13 | End: 2019-07-13 | Stop reason: SURG

## 2019-07-13 RX ORDER — PROMETHAZINE HYDROCHLORIDE 25 MG/1
25 TABLET ORAL ONCE AS NEEDED
Status: DISCONTINUED | OUTPATIENT
Start: 2019-07-13 | End: 2019-07-13 | Stop reason: HOSPADM

## 2019-07-13 RX ORDER — ACETAMINOPHEN 500 MG
500 TABLET ORAL EVERY 6 HOURS PRN
COMMUNITY

## 2019-07-13 RX ORDER — HYDRALAZINE HYDROCHLORIDE 20 MG/ML
5 INJECTION INTRAMUSCULAR; INTRAVENOUS
Status: DISCONTINUED | OUTPATIENT
Start: 2019-07-13 | End: 2019-07-13 | Stop reason: HOSPADM

## 2019-07-13 RX ADMIN — HYDRALAZINE HYDROCHLORIDE 5 MG: 20 INJECTION INTRAMUSCULAR; INTRAVENOUS at 09:50

## 2019-07-13 RX ADMIN — OXYCODONE HYDROCHLORIDE AND ACETAMINOPHEN 1 TABLET: 7.5; 325 TABLET ORAL at 09:48

## 2019-07-13 RX ADMIN — DEXAMETHASONE SODIUM PHOSPHATE 6 MG: 10 INJECTION INTRAMUSCULAR; INTRAVENOUS at 08:23

## 2019-07-13 RX ADMIN — FENTANYL CITRATE 50 MCG: 50 INJECTION INTRAMUSCULAR; INTRAVENOUS at 09:29

## 2019-07-13 RX ADMIN — MEPIVACAINE HYDROCHLORIDE 8 ML: 20 INJECTION, SOLUTION EPIDURAL; INFILTRATION at 07:47

## 2019-07-13 RX ADMIN — ROPIVACAINE HYDROCHLORIDE 20 ML: 5 INJECTION, SOLUTION EPIDURAL; INFILTRATION; PERINEURAL at 07:47

## 2019-07-13 RX ADMIN — GLYCOPYRROLATE 0.2 MG: 0.2 INJECTION INTRAMUSCULAR; INTRAVENOUS at 08:32

## 2019-07-13 RX ADMIN — PROPOFOL 200 MG: 10 INJECTION, EMULSION INTRAVENOUS at 08:02

## 2019-07-13 RX ADMIN — KETOROLAC TROMETHAMINE 30 MG: 30 INJECTION, SOLUTION INTRAMUSCULAR; INTRAVENOUS at 09:20

## 2019-07-13 RX ADMIN — FENTANYL CITRATE 50 MCG: 50 INJECTION INTRAMUSCULAR; INTRAVENOUS at 07:26

## 2019-07-13 RX ADMIN — CEFAZOLIN SODIUM 2 G: 2 INJECTION, SOLUTION INTRAVENOUS at 07:58

## 2019-07-13 RX ADMIN — SODIUM CHLORIDE, POTASSIUM CHLORIDE, SODIUM LACTATE AND CALCIUM CHLORIDE 9 ML/HR: 600; 310; 30; 20 INJECTION, SOLUTION INTRAVENOUS at 06:38

## 2019-07-13 RX ADMIN — SODIUM CHLORIDE, POTASSIUM CHLORIDE, SODIUM LACTATE AND CALCIUM CHLORIDE: 600; 310; 30; 20 INJECTION, SOLUTION INTRAVENOUS at 06:40

## 2019-07-13 RX ADMIN — SODIUM CHLORIDE, POTASSIUM CHLORIDE, SODIUM LACTATE AND CALCIUM CHLORIDE 9 ML/HR: 600; 310; 30; 20 INJECTION, SOLUTION INTRAVENOUS at 09:50

## 2019-07-13 RX ADMIN — MIDAZOLAM 1 MG: 1 INJECTION INTRAMUSCULAR; INTRAVENOUS at 07:22

## 2019-07-13 RX ADMIN — PROMETHAZINE HYDROCHLORIDE 12.5 MG: 25 INJECTION INTRAMUSCULAR; INTRAVENOUS at 09:27

## 2019-07-13 RX ADMIN — FENTANYL CITRATE 50 MCG: 50 INJECTION INTRAMUSCULAR; INTRAVENOUS at 08:29

## 2019-07-13 RX ADMIN — FAMOTIDINE 20 MG: 10 INJECTION INTRAVENOUS at 07:21

## 2019-07-13 RX ADMIN — ONDANSETRON 4 MG: 2 INJECTION INTRAMUSCULAR; INTRAVENOUS at 09:09

## 2019-07-13 RX ADMIN — HYDRALAZINE HYDROCHLORIDE 5 MG: 20 INJECTION INTRAMUSCULAR; INTRAVENOUS at 10:05

## 2019-07-13 RX ADMIN — LIDOCAINE HYDROCHLORIDE 100 MG: 20 INJECTION, SOLUTION INFILTRATION; PERINEURAL at 08:02

## 2019-07-13 NOTE — ANESTHESIA PROCEDURE NOTES
Airway  Urgency: elective    Date/Time: 7/13/2019 8:03 AM  Airway not difficult    General Information and Staff    Patient location during procedure: OR  Anesthesiologist: Karey Montesinos MD  CRNA: Jovany Frey CRNA    Indications and Patient Condition  Indications for airway management: airway protection    Preoxygenated: yes  MILS not maintained throughout  Mask difficulty assessment: 1 - vent by mask    Final Airway Details  Final airway type: supraglottic airway      Successful airway: classic  Size 4    Number of attempts at approach: 1    Additional Comments  Pre O2, SIAI

## 2019-07-13 NOTE — ANESTHESIA PREPROCEDURE EVALUATION
Anesthesia Evaluation     history of anesthetic complications: PONV  NPO Solid Status: > 8 hours             Airway   Mallampati: III  TM distance: >3 FB  Neck ROM: limited  No difficulty expected  Dental - normal exam     Pulmonary - normal exam   Cardiovascular - normal exam    ECG reviewed    (+) hypertension well controlled, hyperlipidemia,       Neuro/Psych  (+) headaches, psychiatric history,       ROS Comment: Small intracranial aneurysm not coiled  GI/Hepatic/Renal/Endo      Musculoskeletal     (+) neck pain,   Abdominal    Substance History      OB/GYN          Other                        Anesthesia Plan    ASA 3     general with block     intravenous induction   Anesthetic plan, all risks, benefits, and alternatives have been provided, discussed and informed consent has been obtained with: patient.

## 2019-07-13 NOTE — ANESTHESIA PROCEDURE NOTES
Peripheral Block      Patient location during procedure: holding area  Reason for block: at surgeon's request and post-op pain management  Performed by  Anesthesiologist: Karey Montesinos MD  Preanesthetic Checklist  Completed: patient identified, site marked, surgical consent, pre-op evaluation, timeout performed, IV checked, risks and benefits discussed and monitors and equipment checked  Prep:  Sterile barriers:gloves  Prep: ChloraPrep  Patient monitoring: continuous pulse oximetry, blood pressure monitoring and EKG  Procedure  Sedation:yes  Performed under: PNB  Guidance:ultrasound guided  ULTRASOUND INTERPRETATION.  Using ultrasound guidance a 20 G gauge needle was placed in close proximity to the brachial plexus nerve, at which point, under ultrasound guidance anesthetic was injected in the area of the nerve and spread of the anesthesia was seen on ultrasound in close proximity thereto.  There were no abnormalities seen on ultrasound; a digital image was taken; and the patient tolerated the procedure with no complications. Images:still images obtained, printed/placed on chart    Laterality:right  Block Type:supraclavicular  Injection Technique:single-shot  Needle Type:echogenic  Needle Gauge:20 G      Medications Used: ropivacaine (NAROPIN) 0.5 % injection, 20 mL  mepivacaine PF (CARBOCAINE) 2 % injection, 8 mL      Post Assessment  Injection Assessment: negative aspiration for heme, no paresthesia on injection and incremental injection  Patient Tolerance:comfortable throughout block  Complications:no

## 2019-07-13 NOTE — ADDENDUM NOTE
Addendum  created 07/13/19 1651 by Miles Mccoy MD    Review and Sign - Ready for Procedure, Review and Sign - Signed

## 2019-07-13 NOTE — OP NOTE
Operative Note    Name: Elena Siddiqui    YOB: 1945  Medical Record Number: [id number]  Attending Physician: Raegan Mcgee,*  Primary Care Physician: Duke Carson MD    Date of Service: 7/13/2019     PREOPERATIVE DIAGNOSIS: Comminuted RIGHT wrist distal radius fracture with displacement.    POSTOPERATIVE DIAGNOSIS: Comminuted RIGHT wrist distal radius fracture with displacement. 3 part intraarticular fracture    PROCEDURE PERFORMED: RIGHT DISTAL RADIUS OPEN REDUCTION INTERNAL FIXATION wrist distal radius fracture utilizing a volar anatomically contoured locking plate (Arthrex titanium).   IMPLANTS   Volar locking plate - 1  3.5 Cortical Screw - 1  3.5 Locking Scres - 2  2.4 Locking screw - 1  2.4 Locking pegs  - 5    ANESTHESIA: Regional block.   ESTIMATED BLOOD LOSS: Less than 10 mL.   SPECIMENS: Nil.   COMPLICATIONS: Nil.   DRAINS: Nil.   SURGEON: Raegan Mcgee M.D.    ASSISTANTS:  BRYCE Leo    INDICATIONS: The patient is a 74 y.o. female who is a RIGHT  hand dominant individual who initially presented to  ED  and was then referred to my office for further evaluation and management. The patient was evaluated in the office. X-rays confirmed presence of a displaced distal radius fracture with intraarticular extension with comminution, loss of radial height inclination and loss of volar tilt.   The patient was indicated for an open reduction and internal fixation of the  wrist distal radius fracture and/or a closed reduction and cast placement. Options were discussed. The patient elected to proceed with an open reduction internal fixation. Likely risks and benefits of the procedure including, but not limited to infection, malunion, nonunion, posttraumatic stiffness, posttraumatic arthritis, possibility of injury to nerves, vessels or tendons have been discussed in detail. Despite the risks involved, the patient elected to proceed and informed consent was  obtained and was scheduled for surgery. The patient was seen in the preoperative holding area and the operative site was marked.    PROCEDURE: The patient has been transferred to [facility] operating room. Preoperative antibiotics   Kefzol  were given intravenously. After achieving adequate regional and analgesia, a well-padded tourniquet was placed over the proximal aspect of the operative arm. The wrist was prepped and draped in the usual sterile fashion. Surgical timeout was done. Correct patient surgical site and site were identified. Tourniquet was elevated to a pressure of 250 mmHg. A skin incision was made on the volar aspect of the wrist for the flexor carpi radialis approach (FCR). The skin and subcutaneous tissue were incised and the FCR tendon was identified. The tendon sheath was incised and the volar aspect tendon was retracted ulnarly and I incised the posterior tendon sheath of the flexor carpi radialis. Pronator quadratus muscle was identified. The muscle was released from the radial insertion and it was elevated from the distal radius. There was comminution, but I was able to line up the volar cortex very satisfactorily. The radial styloid fragment was separate. Again, there was severe osteopenia. I reduced the fracture by manipulation with traction and then gently the volar tilt was corrected. At this stage, the anatomically contoured volar locking plate was selected to span the fracture site. The plate was seated and it was found to be seated satisfactorily. The position was checked after temporary fixation with a K wire. It was found to be in good position. I placed a single cortical screw in the oblong hole and followed by this, a locking screw was placed in the distal metaphyseal portion of the plate. The position of the plate, the reduction of the fracture, and the restoration of the fracture alignment was found to be very satisfactory. I later proceeded with filling further  locking screws, 2 in the shaft portion of the plate, and a total of 4 more pegs in the metaphyseal portion, including 2 of them going into the radial styloid. The reduction of the fracture, fixation of the fracture, and position of the hardware was found to be satisfactory and stable. The sponge count and needle count was found to be correct. Incision was thoroughly irrigated with saline and the pronator quadratus was reattached to its insertion and I closed the incision in layers with a subcuticular Monocryl suture. Steri-Strips were applied.     Sterile dressings were placed and a well-padded dorsal splint was placed. The patient tolerated the procedure well. Good distal pulses and good capillary refill was confirmed. I have given the patient a shoulder sling for comfort. In addition, the patient is being discharged home with instructions to keep the operative extremity elevated, nonweightbearing on the operative extremity, encourage active finger movements and to keep the splint dry and intact. Elevate fingers above heart level. Encouraged to move fingers, elbow and shoulders once the block wears off.     The patient has some supply of   Norco 5/325, 1 p.o. q.4 h. p.r.n. moderate or severe pain.   The patient is to follow up with me in the office in approximately 10 days  and will call the office at [phone] for appointment.     I discussed the satisfactory performance of the procedure with the patient's family and discussed with them The postoperative management.    Raegan Mcgee M.D.*    7/13/2019      9:25 AM    CC:Duke Carson MD; MD Arely Rosario Madhusudhan R,*

## 2019-12-01 NOTE — ANESTHESIA POSTPROCEDURE EVALUATION
Patient: Elena Siddiqui    Procedure Summary     Date:  07/13/19 Room / Location:  [facility] OR [address] [place] [address] MAIN OR    Anesthesia Start:  0757 Anesthesia Stop:  0929    Procedure:  ULNA/RADIUS OPEN REDUCTION INTERNAL FIXATION (Right Wrist) Diagnosis:       Closed fracture of lower end of radius and ulna, right, initial encounter      (Closed fracture of lower end of radius and ulna, right, initial encounter [S52.501A, S52.601A])    Surgeon:  Raegan Mcgee MD Provider:  Karey Montesinos MD    Anesthesia Type:  general with block ASA Status:  3          Anesthesia Type: general with block  Last vitals  BP   176/85 (07/13/19 1020)   Temp   36.5 °C (97.7 °F) (07/13/19 1020)   Pulse   93 (07/13/19 1020)   Resp   16 (07/13/19 1020)     SpO2   92 % (07/13/19 1020)     Post Anesthesia Care and Evaluation      Comments: Pt. Discharged prior to being evaluated by anesthesia.  Chart is reviewed and no complications are noted.  THIS CASE IS NOT MEDICALLY DIRECTED       DISPLAY PLAN FREE TEXT

## 2022-09-29 DIAGNOSIS — I72.9 ANEURYSM: Primary | ICD-10-CM

## 2022-11-03 ENCOUNTER — TELEPHONE (OUTPATIENT)
Dept: NEUROSURGERY | Facility: CLINIC | Age: 77
End: 2022-11-03

## 2023-01-05 ENCOUNTER — HOSPITAL ENCOUNTER (OUTPATIENT)
Dept: MRI IMAGING | Facility: HOSPITAL | Age: 78
Discharge: HOME | End: 2023-01-05
Payer: MEDICARE

## 2023-01-05 ENCOUNTER — APPOINTMENT (OUTPATIENT)
Dept: OTHER | Facility: HOSPITAL | Age: 78
End: 2023-01-05
Payer: MEDICARE

## 2023-01-05 DIAGNOSIS — I72.9 ANEURYSM: ICD-10-CM

## 2023-01-05 DIAGNOSIS — Z09 FOLLOW-UP EXAM: ICD-10-CM

## 2023-01-05 LAB — CREAT BLDA-MCNC: 0.9 MG/DL (ref 0.6–1.3)

## 2023-01-05 PROCEDURE — 0 GADOBENATE DIMEGLUMINE 529 MG/ML SOLUTION: Performed by: RADIOLOGY

## 2023-01-05 PROCEDURE — A9577 INJ MULTIHANCE: HCPCS | Performed by: RADIOLOGY

## 2023-01-05 PROCEDURE — 82565 ASSAY OF CREATININE: CPT

## 2023-01-05 PROCEDURE — 70546 MR ANGIOGRAPH HEAD W/O&W/DYE: CPT

## 2023-01-05 RX ADMIN — GADOBENATE DIMEGLUMINE 17 ML: 529 INJECTION, SOLUTION INTRAVENOUS at 14:23

## 2023-01-16 NOTE — PROGRESS NOTES
"Subjective   Patient ID: Elena Siddiqui is a 77 y.o. female is here today for a 5Y aneurysm follow-up.  MRA head done on 1/5/23.  Today pt reports some occasional headaches and falling a lot.  History of Present Illness  77 y.o. right handed female who originally presented to the ER here at [facility] in 2016 with a \" horrible\" headache.  She states the headache was primarily in the back left side of her head radiating down into the left neck. Her son, who is a nurse, also noted that her pupils were unequal in size.  Specifically, the right pupil was larger than the left.  He was concerned that she may be having a stroke and told her to go to the ER for further evaluation.  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  She also underwent MRA of the head and neck revealing no stenosis.       The patient stated that the headache pain was intermittent.  She described the headache as a tight muscle ache feeling that was dull.  She also reported intermittent blurry vision that resolved.  She denied any neuro deficits.  She subsequently underwent follow-up imaging with an MRA on 12/18/2017 and most recently 1/5/2023.  She now returns to review the findings of the most recent study.  She has no family history of cerebral aneurysms or subarachnoid hemorrhage.  She is a non-smoker, but does have hypertension and hyperlipidemia which are medically controlled.  Patient continues to currently have no neurologic complaints and no asymmetry of the pupils.       The following portions of the patient's history were reviewed and updated as appropriate:   She  has a past medical history of Aneurysm (HCC), Balance problems, Depression, Frequent falls, Frequent UTI, Hammer toe of left foot, Headaches, cluster, Hypercholesteremia, Hypertension, Hyponatremia, Neck pain, PONV (postoperative nausea and vomiting), and Radius fracture.  She does not have any " pertinent problems on file.  She  has a past surgical history that includes Hip Arthroplasty (Left); Knee Arthroplasty (Bilateral); Other surgical history; ORIF humerus fracture (Left, 12/30/2016); Toe Surgery (Left); Bunionectomy (Left); Hammer Toe Repair (Left, 2/9/2018); and ORIF wrist fracture (Right, 7/13/2019).  Her family history includes Diabetes in her paternal grandmother.  She  reports that she has never smoked. She has never used smokeless tobacco. She reports current alcohol use. She reports that she does not use drugs.  Current Outpatient Medications   Medication Sig Dispense Refill   • acetaminophen (TYLENOL) 500 MG tablet Take 500 mg by mouth Every 6 (Six) Hours As Needed for Mild Pain .     • aspirin 81 MG chewable tablet Chew 81 mg Daily.     • atorvastatin (LIPITOR) 10 MG tablet Take 10 mg by mouth Daily. Take one tablet daily     • ibuprofen (ADVIL,MOTRIN) 200 MG tablet Take 200 mg by mouth Every 6 (Six) Hours As Needed for Mild Pain .     • isosorbide dinitrate (ISORDIL) 20 MG tablet Take 20 mg by mouth 3 (Three) Times a Day.     • lisinopril (PRINIVIL,ZESTRIL) 20 MG tablet Take 20 mg by mouth every night.     • PARoxetine (PAXIL) 20 MG tablet Take 20 mg by mouth Every Morning.       No current facility-administered medications for this visit.     Current Outpatient Medications on File Prior to Visit   Medication Sig   • acetaminophen (TYLENOL) 500 MG tablet Take 500 mg by mouth Every 6 (Six) Hours As Needed for Mild Pain .   • aspirin 81 MG chewable tablet Chew 81 mg Daily.   • atorvastatin (LIPITOR) 10 MG tablet Take 10 mg by mouth Daily. Take one tablet daily   • ibuprofen (ADVIL,MOTRIN) 200 MG tablet Take 200 mg by mouth Every 6 (Six) Hours As Needed for Mild Pain .   • isosorbide dinitrate (ISORDIL) 20 MG tablet Take 20 mg by mouth 3 (Three) Times a Day.   • lisinopril (PRINIVIL,ZESTRIL) 20 MG tablet Take 20 mg by mouth every night.   • PARoxetine (PAXIL) 20 MG tablet Take 20 mg by mouth  Every Morning.     No current facility-administered medications on file prior to visit.     She is allergic to levofloxacin and sulfa antibiotics..    Review of Systems   Eyes: Negative for visual disturbance.   Gastrointestinal: Negative for nausea.   Musculoskeletal: Positive for gait problem (falls alot).   Neurological: Positive for headaches (on occasion). Negative for dizziness, seizures, syncope, speech difficulty, light-headedness and numbness.   Psychiatric/Behavioral: Negative for confusion and decreased concentration.       Objective    Vitals:    01/17/23 1315   BP: 142/76   Pulse: 72   Resp: 16   Temp: 97.3 °F (36.3 °C)   SpO2: 96%     Body mass index is 26.16 kg/m².    Physical Exam  Vitals and nursing note reviewed.   Constitutional:       General: She is not in acute distress.  HENT:      Head: Normocephalic.      Nose: Nose normal.      Mouth/Throat:      Mouth: Mucous membranes are moist.   Eyes:      Extraocular Movements: Extraocular movements intact.      Conjunctiva/sclera: Conjunctivae normal.      Pupils: Pupils are equal, round, and reactive to light.   Cardiovascular:      Rate and Rhythm: Normal rate.   Pulmonary:      Effort: Pulmonary effort is normal.   Musculoskeletal:         General: Normal range of motion.      Cervical back: Normal range of motion.   Skin:     General: Skin is warm and dry.   Neurological:      General: No focal deficit present.      Mental Status: She is alert and oriented to person, place, and time.      Cranial Nerves: No cranial nerve deficit.      Sensory: No sensory deficit.      Motor: No weakness.      Coordination: Coordination normal.      Gait: Gait normal.   Psychiatric:         Mood and Affect: Mood normal.         Behavior: Behavior normal.         Thought Content: Thought content normal.         Judgment: Judgment normal.       Neurologic Exam     Mental Status   Oriented to person, place, and time.     Cranial Nerves     CN III, IV, VI   Pupils are  equal, round, and reactive to light.      Assessment & Plan   Independent Review of Radiographic Studies:    The MRA dated 2023 and compared to the previous of 2017 was reviewed with the patient and shows the right posterior communicating artery aneurysm as well as the right cavernous carotid aneurysm both of which appear to have enlarged in the interim.  The posterior communicating artery aneurysm now measures 4.5 mm in its greatest dimension which is increased from the previous measurement of 4 mm.  The small cavernous ICA aneurysm now measures 1.6 mm in its greatest dimension versus 1.1 mm previously.  Medical Decision Makin-year-old female with history of 2 right ICA aneurysms 1 of which is a posterior communicating artery aneurysm that now measures 4.5 mm in maximal dimension having increased 0.5 mm in 5 years.  Using the PHASES score, the patient's rupture risk is 1.7% over the next 5 years and 2.1% lifetime.  There is some irregularity of the aneurysm with possibly more lobulation making rupture more of a concern, but the patient is a non-smoker with no family history of aneurysms or subarachnoid hemorrhage.  She does have controlled hypertension on medication as well as hyperlipidemia also on Lipitor.  No current neurologic complaints are reported and instead of a 5-year follow-up MRA, a 2-year follow-up exam will be obtained to more closely monitor the aneurysm.  Endovascular treatment with flow diversion was explained to the patient including the risks, alternatives and procedure, but the patient is still satisfied with continued imaging observation especially given her age.  She was advised to seek immediate healthcare should a severe headache develop.  Diagnoses and all orders for this visit:    1. Right Posterior Communicating Artery Aneurysm (Primary)    2. Hypertension, unspecified type    3. Hypercholesteremia      Return in about 2 years (around 2025) for Recheck, MRA  Head.  Continue medical management of her cerebrovascular risk factors.

## 2023-01-17 ENCOUNTER — OFFICE VISIT (OUTPATIENT)
Dept: NEUROSURGERY | Facility: CLINIC | Age: 78
End: 2023-01-17
Payer: MEDICARE

## 2023-01-17 VITALS
DIASTOLIC BLOOD PRESSURE: 76 MMHG | SYSTOLIC BLOOD PRESSURE: 142 MMHG | BODY MASS INDEX: 26.21 KG/M2 | RESPIRATION RATE: 16 BRPM | WEIGHT: 167 LBS | HEART RATE: 72 BPM | HEIGHT: 67 IN | OXYGEN SATURATION: 96 % | TEMPERATURE: 97.3 F

## 2023-01-17 DIAGNOSIS — I72.9 ANEURYSM: Primary | ICD-10-CM

## 2023-01-17 DIAGNOSIS — I10 HYPERTENSION, UNSPECIFIED TYPE: ICD-10-CM

## 2023-01-17 DIAGNOSIS — E78.00 HYPERCHOLESTEREMIA: ICD-10-CM

## 2023-01-17 PROCEDURE — 99204 OFFICE O/P NEW MOD 45 MIN: CPT | Performed by: RADIOLOGY

## 2023-01-17 RX ORDER — ISOSORBIDE DINITRATE 20 MG/1
20 TABLET ORAL 3 TIMES DAILY
COMMUNITY

## 2023-01-20 ENCOUNTER — PATIENT ROUNDING (BHMG ONLY) (OUTPATIENT)
Dept: NEUROSURGERY | Facility: CLINIC | Age: 78
End: 2023-01-20
Payer: MEDICARE

## 2023-11-20 ENCOUNTER — APPOINTMENT (OUTPATIENT)
Dept: GENERAL RADIOLOGY | Facility: HOSPITAL | Age: 78
End: 2023-11-20
Payer: MEDICARE

## 2023-11-20 ENCOUNTER — HOSPITAL ENCOUNTER (OUTPATIENT)
Facility: HOSPITAL | Age: 78
Setting detail: OBSERVATION
LOS: 1 days | Discharge: HOME | End: 2023-11-21
Attending: EMERGENCY MEDICINE | Admitting: INTERNAL MEDICINE
Payer: MEDICARE

## 2023-11-20 DIAGNOSIS — R29.6 FREQUENT FALLS: ICD-10-CM

## 2023-11-20 DIAGNOSIS — S42.322A CLOSED DISPLACED TRANSVERSE FRACTURE OF SHAFT OF LEFT HUMERUS, INITIAL ENCOUNTER: Primary | ICD-10-CM

## 2023-11-20 PROBLEM — S42.309A HUMERUS SHAFT FRACTURE: Status: ACTIVE | Noted: 2023-11-20

## 2023-11-20 LAB
ABO GROUP BLD: NORMAL
ALBUMIN SERPL-MCNC: 4.1 G/DL (ref 3.5–5.2)
ALBUMIN/GLOB SERPL: 1.8 G/DL
ALP SERPL-CCNC: 52 U/L (ref 39–117)
ALT SERPL W P-5'-P-CCNC: 16 U/L (ref 1–33)
ANION GAP SERPL CALCULATED.3IONS-SCNC: 9.1 MMOL/L (ref 5–15)
AST SERPL-CCNC: 16 U/L (ref 1–32)
BASOPHILS # BLD AUTO: 0.02 10*3/MM3 (ref 0–0.2)
BASOPHILS NFR BLD AUTO: 0.2 % (ref 0–1.5)
BILIRUB SERPL-MCNC: 0.5 MG/DL (ref 0–1.2)
BLD GP AB SCN SERPL QL: NEGATIVE
BUN SERPL-MCNC: 12 MG/DL (ref 8–23)
BUN/CREAT SERPL: 14.1 (ref 7–25)
CALCIUM SPEC-SCNC: 8.9 MG/DL (ref 8.6–10.5)
CHLORIDE SERPL-SCNC: 99 MMOL/L (ref 98–107)
CO2 SERPL-SCNC: 23.9 MMOL/L (ref 22–29)
CREAT SERPL-MCNC: 0.85 MG/DL (ref 0.57–1)
DEPRECATED RDW RBC AUTO: 41.8 FL (ref 37–54)
EGFRCR SERPLBLD CKD-EPI 2021: 69.4 ML/MIN/1.73
EOSINOPHIL # BLD AUTO: 0.03 10*3/MM3 (ref 0–0.4)
EOSINOPHIL NFR BLD AUTO: 0.3 % (ref 0.3–6.2)
ERYTHROCYTE [DISTWIDTH] IN BLOOD BY AUTOMATED COUNT: 13.1 % (ref 12.3–15.4)
GLOBULIN UR ELPH-MCNC: 2.3 GM/DL
GLUCOSE SERPL-MCNC: 145 MG/DL (ref 65–99)
HCT VFR BLD AUTO: 41.2 % (ref 34–46.6)
HGB BLD-MCNC: 14 G/DL (ref 12–15.9)
IMM GRANULOCYTES # BLD AUTO: 0.04 10*3/MM3 (ref 0–0.05)
IMM GRANULOCYTES NFR BLD AUTO: 0.4 % (ref 0–0.5)
INR PPP: 1.09 (ref 0.9–1.1)
LYMPHOCYTES # BLD AUTO: 0.78 10*3/MM3 (ref 0.7–3.1)
LYMPHOCYTES NFR BLD AUTO: 7.8 % (ref 19.6–45.3)
MCH RBC QN AUTO: 29.9 PG (ref 26.6–33)
MCHC RBC AUTO-ENTMCNC: 34 G/DL (ref 31.5–35.7)
MCV RBC AUTO: 88 FL (ref 79–97)
MONOCYTES # BLD AUTO: 0.68 10*3/MM3 (ref 0.1–0.9)
MONOCYTES NFR BLD AUTO: 6.8 % (ref 5–12)
NEUTROPHILS NFR BLD AUTO: 8.41 10*3/MM3 (ref 1.7–7)
NEUTROPHILS NFR BLD AUTO: 84.5 % (ref 42.7–76)
NRBC BLD AUTO-RTO: 0 /100 WBC (ref 0–0.2)
PLATELET # BLD AUTO: 203 10*3/MM3 (ref 140–450)
PMV BLD AUTO: 10.8 FL (ref 6–12)
POTASSIUM SERPL-SCNC: 4.7 MMOL/L (ref 3.5–5.2)
PROT SERPL-MCNC: 6.4 G/DL (ref 6–8.5)
PROTHROMBIN TIME: 14.2 SECONDS (ref 11.7–14.2)
QT INTERVAL: 421 MS
QTC INTERVAL: 438 MS
RBC # BLD AUTO: 4.68 10*6/MM3 (ref 3.77–5.28)
RH BLD: POSITIVE
SODIUM SERPL-SCNC: 132 MMOL/L (ref 136–145)
T&S EXPIRATION DATE: NORMAL
WBC NRBC COR # BLD AUTO: 9.96 10*3/MM3 (ref 3.4–10.8)

## 2023-11-20 PROCEDURE — 85610 PROTHROMBIN TIME: CPT | Performed by: EMERGENCY MEDICINE

## 2023-11-20 PROCEDURE — 25010000002 HYDROMORPHONE PER 4 MG: Performed by: EMERGENCY MEDICINE

## 2023-11-20 PROCEDURE — 99284 EMERGENCY DEPT VISIT MOD MDM: CPT

## 2023-11-20 PROCEDURE — 80053 COMPREHEN METABOLIC PANEL: CPT | Performed by: EMERGENCY MEDICINE

## 2023-11-20 PROCEDURE — 86850 RBC ANTIBODY SCREEN: CPT | Performed by: EMERGENCY MEDICINE

## 2023-11-20 PROCEDURE — 96376 TX/PRO/DX INJ SAME DRUG ADON: CPT

## 2023-11-20 PROCEDURE — 86900 BLOOD TYPING SEROLOGIC ABO: CPT | Performed by: EMERGENCY MEDICINE

## 2023-11-20 PROCEDURE — 93005 ELECTROCARDIOGRAM TRACING: CPT | Performed by: EMERGENCY MEDICINE

## 2023-11-20 PROCEDURE — 96375 TX/PRO/DX INJ NEW DRUG ADDON: CPT

## 2023-11-20 PROCEDURE — 25010000002 HYDROMORPHONE 1 MG/ML SOLUTION: Performed by: EMERGENCY MEDICINE

## 2023-11-20 PROCEDURE — 85025 COMPLETE CBC W/AUTO DIFF WBC: CPT | Performed by: EMERGENCY MEDICINE

## 2023-11-20 PROCEDURE — 25010000002 MORPHINE PER 10 MG: Performed by: ORTHOPAEDIC SURGERY

## 2023-11-20 PROCEDURE — 25010000002 ONDANSETRON PER 1 MG: Performed by: EMERGENCY MEDICINE

## 2023-11-20 PROCEDURE — 25010000002 HYDROMORPHONE PER 4 MG: Performed by: INTERNAL MEDICINE

## 2023-11-20 PROCEDURE — 25010000002 ONDANSETRON PER 1 MG: Performed by: INTERNAL MEDICINE

## 2023-11-20 PROCEDURE — 86901 BLOOD TYPING SEROLOGIC RH(D): CPT | Performed by: EMERGENCY MEDICINE

## 2023-11-20 PROCEDURE — 96374 THER/PROPH/DIAG INJ IV PUSH: CPT

## 2023-11-20 PROCEDURE — 73060 X-RAY EXAM OF HUMERUS: CPT

## 2023-11-20 RX ORDER — HYDROCODONE BITARTRATE AND ACETAMINOPHEN 5; 325 MG/1; MG/1
1 TABLET ORAL EVERY 4 HOURS PRN
Status: DISCONTINUED | OUTPATIENT
Start: 2023-11-20 | End: 2023-11-21 | Stop reason: HOSPADM

## 2023-11-20 RX ORDER — SODIUM CHLORIDE 0.9 % (FLUSH) 0.9 %
10 SYRINGE (ML) INJECTION EVERY 12 HOURS SCHEDULED
Status: DISCONTINUED | OUTPATIENT
Start: 2023-11-20 | End: 2023-11-21 | Stop reason: HOSPADM

## 2023-11-20 RX ORDER — METOPROLOL SUCCINATE 100 MG/1
100 TABLET, EXTENDED RELEASE ORAL DAILY
Status: DISCONTINUED | OUTPATIENT
Start: 2023-11-20 | End: 2023-11-21 | Stop reason: HOSPADM

## 2023-11-20 RX ORDER — SODIUM CHLORIDE 0.9 % (FLUSH) 0.9 %
10 SYRINGE (ML) INJECTION AS NEEDED
Status: DISCONTINUED | OUTPATIENT
Start: 2023-11-20 | End: 2023-11-21 | Stop reason: HOSPADM

## 2023-11-20 RX ORDER — HYDROMORPHONE HYDROCHLORIDE 1 MG/ML
0.5 INJECTION, SOLUTION INTRAMUSCULAR; INTRAVENOUS; SUBCUTANEOUS
Status: DISCONTINUED | OUTPATIENT
Start: 2023-11-20 | End: 2023-11-20

## 2023-11-20 RX ORDER — ATORVASTATIN CALCIUM 20 MG/1
10 TABLET, FILM COATED ORAL NIGHTLY
Status: DISCONTINUED | OUTPATIENT
Start: 2023-11-20 | End: 2023-11-21 | Stop reason: HOSPADM

## 2023-11-20 RX ORDER — LOSARTAN POTASSIUM 50 MG/1
50 TABLET ORAL DAILY
Status: ON HOLD | COMMUNITY
End: 2023-11-21 | Stop reason: SDUPTHER

## 2023-11-20 RX ORDER — METOPROLOL SUCCINATE 100 MG/1
100 TABLET, EXTENDED RELEASE ORAL DAILY
COMMUNITY

## 2023-11-20 RX ORDER — LOSARTAN POTASSIUM 50 MG/1
50 TABLET ORAL DAILY
Status: DISCONTINUED | OUTPATIENT
Start: 2023-11-20 | End: 2023-11-21 | Stop reason: HOSPADM

## 2023-11-20 RX ORDER — BISACODYL 10 MG
10 SUPPOSITORY, RECTAL RECTAL DAILY PRN
Status: DISCONTINUED | OUTPATIENT
Start: 2023-11-20 | End: 2023-11-21 | Stop reason: HOSPADM

## 2023-11-20 RX ORDER — ONDANSETRON 2 MG/ML
4 INJECTION INTRAMUSCULAR; INTRAVENOUS ONCE
Status: COMPLETED | OUTPATIENT
Start: 2023-11-20 | End: 2023-11-20

## 2023-11-20 RX ORDER — AMOXICILLIN 250 MG
2 CAPSULE ORAL 2 TIMES DAILY
Status: DISCONTINUED | OUTPATIENT
Start: 2023-11-20 | End: 2023-11-21 | Stop reason: HOSPADM

## 2023-11-20 RX ORDER — BISACODYL 5 MG/1
5 TABLET, DELAYED RELEASE ORAL DAILY PRN
Status: DISCONTINUED | OUTPATIENT
Start: 2023-11-20 | End: 2023-11-21 | Stop reason: HOSPADM

## 2023-11-20 RX ORDER — RANOLAZINE 500 MG/1
500 TABLET, EXTENDED RELEASE ORAL 2 TIMES DAILY
Status: DISCONTINUED | OUTPATIENT
Start: 2023-11-20 | End: 2023-11-21 | Stop reason: HOSPADM

## 2023-11-20 RX ORDER — ISOSORBIDE DINITRATE 20 MG/1
20 TABLET ORAL 3 TIMES DAILY
Status: DISCONTINUED | OUTPATIENT
Start: 2023-11-20 | End: 2023-11-21 | Stop reason: HOSPADM

## 2023-11-20 RX ORDER — ONDANSETRON 2 MG/ML
4 INJECTION INTRAMUSCULAR; INTRAVENOUS EVERY 6 HOURS PRN
Status: DISCONTINUED | OUTPATIENT
Start: 2023-11-20 | End: 2023-11-21 | Stop reason: HOSPADM

## 2023-11-20 RX ORDER — SODIUM CHLORIDE 9 MG/ML
40 INJECTION, SOLUTION INTRAVENOUS AS NEEDED
Status: DISCONTINUED | OUTPATIENT
Start: 2023-11-20 | End: 2023-11-21 | Stop reason: HOSPADM

## 2023-11-20 RX ORDER — POLYETHYLENE GLYCOL 3350 17 G/17G
17 POWDER, FOR SOLUTION ORAL DAILY PRN
Status: DISCONTINUED | OUTPATIENT
Start: 2023-11-20 | End: 2023-11-21 | Stop reason: HOSPADM

## 2023-11-20 RX ORDER — MORPHINE SULFATE 2 MG/ML
1 INJECTION, SOLUTION INTRAMUSCULAR; INTRAVENOUS EVERY 4 HOURS PRN
Status: DISCONTINUED | OUTPATIENT
Start: 2023-11-20 | End: 2023-11-21 | Stop reason: HOSPADM

## 2023-11-20 RX ORDER — PAROXETINE HYDROCHLORIDE 20 MG/1
20 TABLET, FILM COATED ORAL EVERY MORNING
Status: DISCONTINUED | OUTPATIENT
Start: 2023-11-21 | End: 2023-11-21 | Stop reason: HOSPADM

## 2023-11-20 RX ORDER — HYDROMORPHONE HYDROCHLORIDE 1 MG/ML
0.5 INJECTION, SOLUTION INTRAMUSCULAR; INTRAVENOUS; SUBCUTANEOUS ONCE
Status: COMPLETED | OUTPATIENT
Start: 2023-11-20 | End: 2023-11-20

## 2023-11-20 RX ORDER — RANOLAZINE 500 MG/1
500 TABLET, EXTENDED RELEASE ORAL 2 TIMES DAILY
COMMUNITY

## 2023-11-20 RX ADMIN — ISOSORBIDE DINITRATE 20 MG: 20 TABLET ORAL at 21:42

## 2023-11-20 RX ADMIN — ONDANSETRON 4 MG: 2 INJECTION INTRAMUSCULAR; INTRAVENOUS at 17:48

## 2023-11-20 RX ADMIN — DOCUSATE SODIUM 50MG AND SENNOSIDES 8.6MG 2 TABLET: 8.6; 5 TABLET, FILM COATED ORAL at 21:38

## 2023-11-20 RX ADMIN — HYDROMORPHONE HYDROCHLORIDE 0.5 MG: 1 INJECTION, SOLUTION INTRAMUSCULAR; INTRAVENOUS; SUBCUTANEOUS at 16:18

## 2023-11-20 RX ADMIN — ATORVASTATIN CALCIUM 10 MG: 20 TABLET, FILM COATED ORAL at 21:38

## 2023-11-20 RX ADMIN — HYDROMORPHONE HYDROCHLORIDE 1 MG: 1 INJECTION, SOLUTION INTRAMUSCULAR; INTRAVENOUS; SUBCUTANEOUS at 14:35

## 2023-11-20 RX ADMIN — Medication 10 ML: at 21:45

## 2023-11-20 RX ADMIN — MORPHINE SULFATE 1 MG: 2 INJECTION, SOLUTION INTRAMUSCULAR; INTRAVENOUS at 21:37

## 2023-11-20 RX ADMIN — ONDANSETRON 4 MG: 2 INJECTION INTRAMUSCULAR; INTRAVENOUS at 12:14

## 2023-11-20 RX ADMIN — HYDROMORPHONE HYDROCHLORIDE 0.5 MG: 1 INJECTION, SOLUTION INTRAMUSCULAR; INTRAVENOUS; SUBCUTANEOUS at 12:54

## 2023-11-20 RX ADMIN — ISOSORBIDE DINITRATE 20 MG: 20 TABLET ORAL at 17:35

## 2023-11-20 NOTE — ED PROVIDER NOTES
EMERGENCY DEPARTMENT ENCOUNTER    Room Number:  26/26  Date seen:  11/20/2023  PCP: Duke Carson MD  Historian: Patient,  and daughter at bedside      HPI:  Chief Complaint: Fall, left arm pain  A complete HPI/ROS/PMH/PSH/SH/FH are unobtainable due to:   Context: Elena Siddiqui is a 78 y.o. female who presents to the ED c/o fall, left arm pain.  Patient tripped and fell at home landing on her left arm.  Complains of pain at the upper left arm.  Denies head injury or loss of consciousness.  Denies recent illness.  Pain is moderate and worsened with movement.  Patient did receive IV fentanyl and Zofran in route.  She is complaining of current nausea.      MEDICAL RECORD REVIEW (non ED)  I reviewed prior medical records including most recent office visit with primary care provider.  Patient has history of hypertension, hyperlipidemia.  She was hospitalized in 2019 with fracture of the right arm which was repaired by Dr. Mcgee.    PAST MEDICAL HISTORY  Active Ambulatory Problems     Diagnosis Date Noted    Visual changes 05/12/2016    Headache 05/13/2016    Aneurysm 06/03/2016    S/P ORIF (open reduction internal fixation) fracture 12/30/2016    Humerus fracture 12/31/2016    Abnormal brain MRI 01/24/2018    Closed fracture of lower end of radius and ulna, right, initial encounter 07/10/2019    Frequent falls     Hypertension     Hypercholesteremia      Resolved Ambulatory Problems     Diagnosis Date Noted    No Resolved Ambulatory Problems     Past Medical History:   Diagnosis Date    Balance problems     Depression     Frequent UTI     Hammer toe of left foot     Headaches, cluster     Hyponatremia     Neck pain     PONV (postoperative nausea and vomiting)     Radius fracture          PAST SURGICAL HISTORY  Past Surgical History:   Procedure Laterality Date    BUNIONECTOMY Left     HAMMER TOE REPAIR Left 2/9/2018    Procedure: LEFT 2ND HAMMER TOE CORRECTION  CORRECTION OF ANGULAR DEFORMITY;   Surgeon: Miles Brown Jr., MD;  Location: [facility] OR [facility];  Service:     HIP ARTHROPLASTY Left     KNEE ARTHROPLASTY Bilateral     ORIF HUMERUS FRACTURE Left 12/30/2016    Procedure: LEFT PROXIMAL HUMERUS  OPEN REDUCTION  INTERNAL FIXATION ;  Surgeon: Alfonso Troy MD;  Location: [facility];  Service:     ORIF WRIST FRACTURE Right 7/13/2019    Procedure: ULNA/RADIUS OPEN REDUCTION INTERNAL FIXATION;  Surgeon: Raegan Mcgee MD;  Location: [facility] OR;  Service: Orthopedics    OTHER SURGICAL HISTORY      Arm surgery (left)    TOE SURGERY Left     hammer toe         FAMILY HISTORY  Family History   Problem Relation Age of Onset    Diabetes Paternal Grandmother     Malig Hyperthermia Neg Hx          SOCIAL HISTORY  Social History     Socioeconomic History    Marital status:    Tobacco Use    Smoking status: Never    Smokeless tobacco: Never   Vaping Use    Vaping Use: Never used   Substance and Sexual Activity    Alcohol use: Yes     Comment: Rarely, special occasions     Drug use: No         ALLERGIES  Levofloxacin and Sulfa antibiotics        REVIEW OF SYSTEMS  Review of Systems   Constitutional:  Negative for fever.   Respiratory:  Negative for shortness of breath.    Cardiovascular:  Negative for chest pain.   Gastrointestinal:  Positive for nausea.   Musculoskeletal:         Left upper extremity pain as per HPI   All other systems reviewed and are negative.           PHYSICAL EXAM  ED Triage Vitals [11/20/23 1057]   Temp Heart Rate Resp BP SpO2   97.4 °F (36.3 °C) 54 16 136/87 98 %      Temp src Heart Rate Source Patient Position BP Location FiO2 (%)   -- -- -- -- --       Physical Exam    GENERAL: Alert pleasant female no obvious distress.  Triage vitals reviewed and are unremarkable  HENT: nares patent, atraumatic  EYES: no scleral icterus  CV: regular rhythm, regular rate  RESPIRATORY: normal effort  ABDOMEN: soft  MUSCULOSKELETAL: Spine-no significant tenderness to palpation or bony  deformity  Upper extremities-right arm is unremarkable without apparent injury.  Left arm with tenderness to palpation over the humerus.  Examination of the hand and wrist are unremarkable without apparent injury, neurovascular intact.  NEURO: Strength sensation and coordination are grossly intact.  Speech and mentation are unremarkable  SKIN: warm, dry      Vital signs and nursing notes reviewed.          LAB RESULTS  Recent Results (from the past 24 hour(s))   Comprehensive Metabolic Panel    Collection Time: 11/20/23 12:26 PM    Specimen: Arm, Right; Blood   Result Value Ref Range    Glucose 145 (H) 65 - 99 mg/dL    BUN 12 8 - 23 mg/dL    Creatinine 0.85 0.57 - 1.00 mg/dL    Sodium 132 (L) 136 - 145 mmol/L    Potassium 4.7 3.5 - 5.2 mmol/L    Chloride 99 98 - 107 mmol/L    CO2 23.9 22.0 - 29.0 mmol/L    Calcium 8.9 8.6 - 10.5 mg/dL    Total Protein 6.4 6.0 - 8.5 g/dL    Albumin 4.1 3.5 - 5.2 g/dL    ALT (SGPT) 16 1 - 33 U/L    AST (SGOT) 16 1 - 32 U/L    Alkaline Phosphatase 52 39 - 117 U/L    Total Bilirubin 0.5 0.0 - 1.2 mg/dL    Globulin 2.3 gm/dL    A/G Ratio 1.8 g/dL    BUN/Creatinine Ratio 14.1 7.0 - 25.0    Anion Gap 9.1 5.0 - 15.0 mmol/L    eGFR 69.4 >60.0 mL/min/1.73   Protime-INR    Collection Time: 11/20/23 12:26 PM    Specimen: Arm, Right; Blood   Result Value Ref Range    Protime 14.2 11.7 - 14.2 Seconds    INR 1.09 0.90 - 1.10   CBC Auto Differential    Collection Time: 11/20/23 12:26 PM    Specimen: Arm, Right; Blood   Result Value Ref Range    WBC 9.96 3.40 - 10.80 10*3/mm3    RBC 4.68 3.77 - 5.28 10*6/mm3    Hemoglobin 14.0 12.0 - 15.9 g/dL    Hematocrit 41.2 34.0 - 46.6 %    MCV 88.0 79.0 - 97.0 fL    MCH 29.9 26.6 - 33.0 pg    MCHC 34.0 31.5 - 35.7 g/dL    RDW 13.1 12.3 - 15.4 %    RDW-SD 41.8 37.0 - 54.0 fl    MPV 10.8 6.0 - 12.0 fL    Platelets 203 140 - 450 10*3/mm3    Neutrophil % 84.5 (H) 42.7 - 76.0 %    Lymphocyte % 7.8 (L) 19.6 - 45.3 %    Monocyte % 6.8 5.0 - 12.0 %    Eosinophil % 0.3  0.3 - 6.2 %    Basophil % 0.2 0.0 - 1.5 %    Immature Grans % 0.4 0.0 - 0.5 %    Neutrophils, Absolute 8.41 (H) 1.70 - 7.00 10*3/mm3    Lymphocytes, Absolute 0.78 0.70 - 3.10 10*3/mm3    Monocytes, Absolute 0.68 0.10 - 0.90 10*3/mm3    Eosinophils, Absolute 0.03 0.00 - 0.40 10*3/mm3    Basophils, Absolute 0.02 0.00 - 0.20 10*3/mm3    Immature Grans, Absolute 0.04 0.00 - 0.05 10*3/mm3    nRBC 0.0 0.0 - 0.2 /100 WBC   Type & Screen    Collection Time: 11/20/23  2:09 PM    Specimen: Blood   Result Value Ref Range    ABO Type O     RH type Positive     Antibody Screen Negative     T&S Expiration Date 11/23/2023 11:59:59 PM    ECG 12 Lead Other; Preoperative    Collection Time: 11/20/23  2:21 PM   Result Value Ref Range    QT Interval 421 ms    QTC Interval 438 ms       Ordered the above labs and independently interpreted results. My findings will be discussed in the medical decision making section below        RADIOLOGY  XR Humerus Left    Result Date: 11/20/2023  XR HUMERUS LEFT-  INDICATIONS: Trauma.  TECHNIQUE: 3 VIEWS OF THE LEFT HUMERUS  COMPARISON: None available  FINDINGS:  An angulated transverse fracture at the mid humeral shaft is noted, with about 1.9 cm lateral displacement of the distal fracture fragment. No other recent fractures are noted. Plate and screw hardware of the proximal left humerus appears intact.       Left humerus fracture.    This report was finalized on 11/20/2023 11:28 AM by Dr. Kenny Ricci M.D on Workstation: [id number]       I ordered and independently reviewed the above noted radiographic studies.      I viewed images of left humerus which showed displaced midshaft fracture per my independent interpretation.    See radiologist's dictation for official interpretation.             PROCEDURES  Splint - Cast - Strapping    Date/Time: 11/20/2023 3:08 PM    Performed by: Krzysztof Long MD  Authorized by: Krzysztof Long MD    Consent:     Consent obtained:  Emergent  situation  Universal protocol:     Patient identity confirmed:  Verbally with patient  Pre-procedure details:     Distal neurologic exam:  Normal    Distal perfusion: distal pulses strong    Procedure details:     Location:  Arm    Arm location:  L upper arm    Cast type:  Short arm    Splint type:  Long arm    Supplies used: Ortho-Glass.    Attestation: Splint applied and adjusted personally by me    Post-procedure details:     Distal neurologic exam:  Normal    Distal perfusion: distal pulses strong      Procedure completion:  Tolerated well, no immediate complications    Post-procedure imaging: not applicable              MEDICATIONS GIVEN IN ER  Medications   ondansetron (ZOFRAN) injection 4 mg (4 mg Intravenous Given 11/20/23 1214)   HYDROmorphone (DILAUDID) injection 0.5 mg (0.5 mg Intravenous Given 11/20/23 1254)   HYDROmorphone (DILAUDID) injection 1 mg (1 mg Intravenous Given 11/20/23 1435)               MEDICAL DECISION MAKING, PROGRESS, and CONSULTS    All labs have been independently reviewed by me.  All radiology studies have been reviewed by me and I have also reviewed the radiology report.   EKG's independently viewed and interpreted by me.  Discussion below represents my analysis of pertinent findings related to patient's condition, differential diagnosis, treatment plan and final disposition.      Additional sources:  - Discussed/ obtained information from independent historians: EMS brought patient, family at bedside, Dr. Mcgee, Dr. Hussein    - External (non-ED) record review: Please see documented above    - Chronic or social conditions impacting care: Age 78, prior orthopedic injuries    - Shared decision making: I discussed ED evaluation and treatment plan with patient who is in agreement.  Patient presented after fall on outstretched left arm.    Found to be neurovascular intact with closed injury.  X-rays revealed significantly displaced midshaft humerus fracture.    Upon consultation with  orthopedic specialist, Dr. Mcgee patient will likely need operative repair and will be admitted to hospitalist service for orthopedic evaluation and likely operative repair.    Labs reviewed unremarkable.      Orders placed during this visit:  Orders Placed This Encounter   Procedures    Splint Cast Strapping    XR Humerus Left    Comprehensive Metabolic Panel    Protime-INR    CBC Auto Differential    Ortho (on-call MD unless specified)    LHA (on-call MD unless specified) Details    ECG 12 Lead Other; Preoperative    Type & Screen    Inpatient Admission    CBC & Differential           Differential diagnosis:    Please see as documented below in ED course      Independent interpretation of labs, radiology studies, and discussions with consultants:  ED Course as of 11/20/23 1510   Mon Nov 20, 2023   1216 Plain films of the left humerus independently turbid by me show displaced and  fracture of the midshaft humerus.    Official interpretation by radiologist is in agreement with this assessment. [DB]   1216 NOV-64-mnxo-old female who is right-handed presents after fall and left arm pain.  Exam is highly suggestive of fracture.    Patient seems to be neurovascular intact.  Will need to take off all close and make sure this is closed and not open injury.  She does seem to be neurovascular intact.    This does appear to be likely operative injury and will put a call out to orthopedics.  Patient has been seen by [facility] and would like to be seen by that group again if possible.    We will give IV Zofran for patient request of nausea. [DB]   8087 Still no call from orthopedics.  Will place second page.  If no callback from [facility] will call on-call provider. [DB]   1995 I discussed treatment and evaluation of this patient with Dr. Mcgee who states that their group would be glad to consult if we could admit to hospital service.  He would like me to place a splint on the left arm.  [DB]   6498 I discussed treatment and evaluation this patient with Dr. Hussein who will admit on behalf of [facility]. [DB]   1510 Labs reviewed do not show significant infection or metabolic disorder. [DB]      ED Course User Index  [DB] Krzysztof Long MD               DIAGNOSIS  Final diagnoses:   Closed displaced transverse fracture of shaft of left humerus, initial encounter   Frequent falls         DISPOSITION  Admission            Latest Documented Vital Signs:  As of 15:10 EST  BP- 155/65 HR- 63 Temp- 97.4 °F (36.3 °C) O2 sat- 94%              --    Please note that portions of this were completed with a voice recognition program.       Note Disclaimer: At [facility], we believe that sharing information builds trust and better relationships. You are receiving this note because you are receiving care at [facility] or recently visited. It is possible you will see health information before a provider has talked with you about it. This kind of information can be easy to misunderstand. To help you fully understand what it means for your health, we urge you to discuss this note with your provider.             Krzysztof Long MD  11/20/23 1072

## 2023-11-20 NOTE — H&P
Internal medicine history and physical  INTERNAL MEDICINE   [facility]       Patient Identification:  Name: Elena Siddiqui  Age: 78 y.o.  Sex: female  :  1945  MRN: [id number]                   Primary Care Physician: Duke Carson MD                               Date of admission:2023    Chief Complaint: Fell on her left side after tripping on the flowerpot earlier today.    History of Present Illness:   Patient is a 78-year-old female who has history of hypertension, dyslipidemia, and coronary artery disease and reported history of frequent falls in the past that last followed by a year and a half ago was in her usual state of health when she woke up this morning.  She did not have any dizziness fatigue weakness or sense of ill health and was walking and tripped on the flowerpot.  Patient denies any loss of consciousness and according to her if it was not for the fall she would not have come to the hospital otherwise.  Workup in the emergency room reveals left midshaft humerus fracture with 1.5 cm displacement.  Patient has had prior injury and falls and had repair of the proximal portion of the left humerus with plates and screws in the past.  Orthopedic surgery service was consulted and it was recommended that patient would need open reduction internal fixation of this fracture.  Splint was applied and patient is being admitted for further care.      Past Medical History:  Past Medical History:   Diagnosis Date    Aneurysm     cerebral, monitored by Dr. Hurley... LAST CHECK 18...  RECENT CT ON FILE FROM FALL    Balance problems     Depression     Frequent falls     Frequent UTI     Hammer toe of left foot     Headaches, cluster     Hypercholesteremia     Hypertension     Hyponatremia     HISTORY OF...    Neck pain     PONV (postoperative nausea and vomiting)     Radius fracture      Past Surgical History:  Past Surgical History:   Procedure Laterality Date     BUNIONECTOMY Left     HAMMER TOE REPAIR Left 2/9/2018    Procedure: LEFT 2ND HAMMER TOE CORRECTION  CORRECTION OF ANGULAR DEFORMITY;  Surgeon: Miles Brown Jr., MD;  Location: [facility];  Service:     HIP ARTHROPLASTY Left     KNEE ARTHROPLASTY Bilateral     ORIF HUMERUS FRACTURE Left 12/30/2016    Procedure: LEFT PROXIMAL HUMERUS  OPEN REDUCTION  INTERNAL FIXATION ;  Surgeon: Alfonso Troy MD;  Location: [facility];  Service:     ORIF WRIST FRACTURE Right 7/13/2019    Procedure: ULNA/RADIUS OPEN REDUCTION INTERNAL FIXATION;  Surgeon: Raegan Mcgee MD;  Location: [facility];  Service: Orthopedics    OTHER SURGICAL HISTORY      Arm surgery (left)    TOE SURGERY Left     hammer toe      Home Meds:  (Not in a hospital admission)    Current Meds:   No current facility-administered medications for this encounter.    Current Outpatient Medications:     aspirin 81 MG chewable tablet, Chew 1 tablet Daily., Disp: , Rfl:     atorvastatin (LIPITOR) 10 MG tablet, Take 1 tablet by mouth Daily., Disp: , Rfl:     isosorbide dinitrate (ISORDIL) 20 MG tablet, Take 1 tablet by mouth 3 (Three) Times a Day., Disp: , Rfl:     losartan (COZAAR) 50 MG tablet, Take 1 tablet by mouth Daily., Disp: , Rfl:     metoprolol succinate XL (TOPROL-XL) 100 MG 24 hr tablet, Take 1 tablet by mouth Daily., Disp: , Rfl:     PARoxetine (PAXIL) 20 MG tablet, Take 1 tablet by mouth Every Morning., Disp: , Rfl:     ranolazine (RANEXA) 500 MG 12 hr tablet, Take 1 tablet by mouth 2 (Two) Times a Day., Disp: , Rfl:     lisinopril (PRINIVIL,ZESTRIL) 20 MG tablet, Take 1 tablet by mouth Every Night., Disp: , Rfl:   Allergies:  Allergies   Allergen Reactions    Levofloxacin Rash    Sulfa Antibiotics Rash     Social History:   Social History     Tobacco Use    Smoking status: Never    Smokeless tobacco: Never   Substance Use Topics    Alcohol use: Yes     Comment: Rarely, special occasions       Family History:  Family History   Problem  "Relation Age of Onset    Diabetes Paternal Grandmother     Malig Hyperthermia Neg Hx           Review of Systems  See history of present illness and past medical history.  Patient denies headache, dizziness, syncope, falls, trauma, change in vision, change in hearing, change in taste, changes in weight, changes in appetite, focal weakness, numbness, or paresthesia.  Patient denies chest pain, palpitations, dyspnea, orthopnea, PND, cough, sinus pressure, rhinorrhea, epistaxis, hemoptysis, nausea, vomiting,hematemesis, diarrhea, constipation or hematchezia.  Denies cold or heat intolerance, polydipsia, polyuria, polyphagia. Denies hematuria, pyuria, dysuria, hesitancy, frequency or urgency. Denies consumption of raw and under cooked meats foods or change in water source.  Denies fever, chills, sweats, night sweats.  Denies missing any routine medications. Remainder of ROS is negative.      Vitals:   /67   Pulse 63   Temp 97.4 °F (36.3 °C)   Resp 16   Ht 167.6 cm (66\")   Wt 75.3 kg (166 lb)   SpO2 94%   BMI 26.79 kg/m²   I/O: No intake or output data in the 24 hours ending 11/20/23 8689  Exam:  Patient is examined using the personal protective equipment as per guidelines from infection control for this particular patient as enacted.  Hand washing was performed before and after patient interaction.  General Appearance:    Alert, cooperative, no distress, appears stated age   Head:    Normocephalic, without obvious abnormality, atraumatic   Eyes:    PERRL, conjunctiva/corneas clear, EOM's intact, both eyes   Ears:    Normal external ear canals, both ears   Nose:   Nares normal, septum midline, mucosa normal, no drainage    or sinus tenderness   Throat:   Lips, tongue, gums normal; oral mucosa pink and moist   Neck: Supple and no adenopathy noted   Back:     Symmetric, no curvature, ROM normal, no CVA tenderness   Lungs:     Clear to auscultation bilaterally, respirations unlabored   Chest Wall:    No " tenderness or deformity    Heart:  S1-S2 regular    Abdomen:   Soft nontender   Extremities:   left arm in splint with range of motion of the hand intact.  She has intact neurovascular status.   Pulses:   Pulses palpable in all extremities; symmetric all extremities   Skin:   Skin color normal, Skin is warm and dry,  no rashes or palpable lesions   Neurologic: Alert and oriented and grossly nonfocal       Data Review:      I reviewed the patient's new clinical results.  Results from last 7 days   Lab Units 11/20/23  1226   WBC 10*3/mm3 9.96   HEMOGLOBIN g/dL 14.0   PLATELETS 10*3/mm3 203     Results from last 7 days   Lab Units 11/20/23  1226   SODIUM mmol/L 132*   POTASSIUM mmol/L 4.7   CHLORIDE mmol/L 99   CO2 mmol/L 23.9   BUN mg/dL 12   CREATININE mg/dL 0.85   CALCIUM mg/dL 8.9   GLUCOSE mg/dL 145*     XR Humerus Left    Result Date: 11/20/2023   Left humerus fracture.    This report was finalized on 11/20/2023 11:28 AM by Dr. Kenny Ricci M.D on Workstation: [employer]     Microbiology Results (last 10 days)       ** No results found for the last 240 hours. **          ECG 12 Lead Other; Preoperative   Final Result   HEART RATE= 65  bpm   RR Interval= 923  ms   WV Interval= 223  ms   P Horizontal Axis= -11  deg   P Front Axis= 49  deg   QRSD Interval= 95  ms   QT Interval= 421  ms   QTcB= 438  ms   QRS Axis= -21  deg   T Wave Axis= 12  deg   - ABNORMAL ECG -   Sinus rhythm   Prolonged WV interval   Borderline left axis deviation   Anteroseptal infarct, age indeterminate   No change from previous tracing   Electronically Signed By: Amanda [facility]) 20-Nov-2023 17:28:00   Date and Time of Study: 2023-11-20 14:21:11          Assessment:  Active Hospital Problems    Diagnosis  POA    **Humerus shaft fracture [S42.309A]  Yes    Hypertension [I10]  Yes    Frequent falls [R29.6]  Not Applicable    Headache [R51.9]  Yes       Medical decision making/CARE plan: See admitting orders  Mechanical fall with left  humerus midshaft closed fracture with displacement and intact neurovascular status-plan is to admit the patient continue with splint and frequent assessment of left hand function for neurovascular status change.  Continue with pain medication as needed with continuous pulse ox monitoring.  Make her n.p.o. after midnight and continue her home medications and hold her aspirin.  Preop clearance: Patient did not have any symptoms of cardiac decompensation and based on the description the fall was entirely mechanical as she tripped on the flowerpot.  Based on this presentation and EKG showing no changes from previous EKG tracings and lack of any active symptoms of cardiac decompensation patient is cleared to undergo needed open reduction internal fixation as per recommendation by orthopedic surgery service.  Hypertension-continue antihypertensive regimen and avoid hypotensive episodes.  Frequent falls provided with OT PT evaluation and fall precautions.  Chronic headaches-continue home regimen.    Vilma Hussein MD   11/20/2023  15:39 EST    Parts of this note may be an electronic transcription/translation of spoken language to printed text using the Dragon dictation system.

## 2023-11-20 NOTE — PLAN OF CARE
Goal Outcome Evaluation:  Plan of Care Reviewed With: patient        Progress: no change  Outcome Evaluation: 77 y/o admitted from ER d/t L humerus fx after a fall at home. AOX4. Bed rest. Voiding function intact via PW. Neuro checks WNL, no c/o numbness/tingling. Pain managed via dilaudid, however caused nausea. Anti-nausea meds given. Dilaudid switched to morphine. Splint to L arm c/d/i. PIV saline locked. Needs met. VSS. 2 L O2. Educated pt on environment. Plan to be NPO at midnight for potnetial surgery tomorrow. Will CTM .

## 2023-11-20 NOTE — ED NOTES
Pt to er via ems from home with c/o mechanical fall. Pt c/o of left upper arm pain. Pt has deformity. Pt given 50mcg of fentanyl and 4mg of zofran in route. No loc, no blood thinners.

## 2023-11-20 NOTE — ED NOTES
Nursing report ED to floor  Elena Siddiqui  78 y.o.  female    HPI (triage note):   Chief Complaint   Patient presents with    Fall    Arm Injury       Elena Siddiqui is a 78 y.o. female who presents to the ED c/o fall, left arm pain.  Patient tripped and fell at home landing on her left arm.  Complains of pain at the upper left arm.  Denies head injury or loss of consciousness.  Denies recent illness.  Pain is moderate and worsened with movement.  Patient did receive IV fentanyl and Zofran in route.  She is complaining of current nausea.     Admitting doctor:   Vilma Hussein MD    Admitting diagnosis:   The primary encounter diagnosis was Closed displaced transverse fracture of shaft of left humerus, initial encounter. A diagnosis of Frequent falls was also pertinent to this visit.    Code status:   Current Code Status       Date Active Code Status Order ID Comments User Context       Prior            Allergies:   Levofloxacin and Sulfa antibiotics    Past Medical History:  Past Medical History:   Diagnosis Date    Aneurysm     cerebral, monitored by Dr. Hurley... LAST CHECK 1/24/18...  RECENT CT ON FILE FROM FALL    Balance problems     Depression     Frequent falls     Frequent UTI     Hammer toe of left foot     Headaches, cluster     Hypercholesteremia     Hypertension     Hyponatremia     HISTORY OF...    Neck pain     PONV (postoperative nausea and vomiting)     Radius fracture         Weight:       11/20/23  1057   Weight: 75.3 kg (166 lb)       Most recent vitals:   Vitals:    11/20/23 1417 11/20/23 1427 11/20/23 1446 11/20/23 1455   BP: 139/83  155/65    Pulse: 62 65 62 63   Resp:       Temp:       SpO2:  95% 93% 94%   Weight:       Height:           Active LDAs/IV Access:   Lines, Drains & Airways       Active LDAs       Name Placement date Placement time Site Days    Peripheral IV 11/20/23 1059 Posterior;Right Hand 11/20/23  1059  Hand  less than 1    Peripheral IV 11/20/23 1226 Right Antecubital 11/20/23   1226  Antecubital  less than 1                    Labs (abnormal labs have a star):   Labs Reviewed   COMPREHENSIVE METABOLIC PANEL - Abnormal; Notable for the following components:       Result Value    Glucose 145 (*)     Sodium 132 (*)     All other components within normal limits    Narrative:     GFR Normal >60  Chronic Kidney Disease <60  Kidney Failure <15    The GFR formula is only valid for adults with stable renal function between ages 18 and 70.   CBC WITH AUTO DIFFERENTIAL - Abnormal; Notable for the following components:    Neutrophil % 84.5 (*)     Lymphocyte % 7.8 (*)     Neutrophils, Absolute 8.41 (*)     All other components within normal limits   PROTIME-INR - Normal   TYPE AND SCREEN   CBC AND DIFFERENTIAL    Narrative:     The following orders were created for panel order CBC & Differential.  Procedure                               Abnormality         Status                     ---------                               -----------         ------                     CBC Auto Differential[[id number]]        Abnormal            Final result                 Please view results for these tests on the individual orders.       EKG:   ECG 12 Lead Other; Preoperative   Preliminary Result   HEART RATE= 65  bpm   RR Interval= 923  ms   HI Interval= 223  ms   P Horizontal Axis= -11  deg   P Front Axis= 49  deg   QRSD Interval= 95  ms   QT Interval= 421  ms   QTcB= 438  ms   QRS Axis= -21  deg   T Wave Axis= 12  deg   - ABNORMAL ECG -   Sinus rhythm   Prolonged HI interval   Borderline left axis deviation   Anteroseptal infarct, age indeterminate   Electronically Signed By:    Date and Time of Study: 2023-11-20 14:21:11          Meds given in ED:   Medications   ondansetron (ZOFRAN) injection 4 mg (4 mg Intravenous Given 11/20/23 1214)   HYDROmorphone (DILAUDID) injection 0.5 mg (0.5 mg Intravenous Given 11/20/23 1254)   HYDROmorphone (DILAUDID) injection 1 mg (1 mg Intravenous Given 11/20/23 1435)       Imaging  results:  XR Humerus Left    Result Date: 11/20/2023   Left humerus fracture.    This report was finalized on 11/20/2023 11:28 AM by Dr. Kenny Ricci M.D on Workstation: [employer]       Ambulatory status:   - assist x2    Social issues:   Social History     Socioeconomic History    Marital status:    Tobacco Use    Smoking status: Never    Smokeless tobacco: Never   Vaping Use    Vaping Use: Never used   Substance and Sexual Activity    Alcohol use: Yes     Comment: Rarely, special occasions     Drug use: No          NIH Stroke Scale:         Liu Mack RN  11/20/23 14:57 EST    Nurse Direct line for any questions: [phone]

## 2023-11-21 VITALS
BODY MASS INDEX: 26.84 KG/M2 | DIASTOLIC BLOOD PRESSURE: 81 MMHG | RESPIRATION RATE: 16 BRPM | OXYGEN SATURATION: 91 % | SYSTOLIC BLOOD PRESSURE: 103 MMHG | HEART RATE: 67 BPM | TEMPERATURE: 97.6 F | WEIGHT: 167 LBS | HEIGHT: 66 IN

## 2023-11-21 LAB
ALBUMIN SERPL-MCNC: 3.6 G/DL (ref 3.5–5.2)
ALBUMIN/GLOB SERPL: 1.6 G/DL
ALP SERPL-CCNC: 44 U/L (ref 39–117)
ALT SERPL W P-5'-P-CCNC: 14 U/L (ref 1–33)
ANION GAP SERPL CALCULATED.3IONS-SCNC: 9 MMOL/L (ref 5–15)
AST SERPL-CCNC: 21 U/L (ref 1–32)
BASOPHILS # BLD AUTO: 0.03 10*3/MM3 (ref 0–0.2)
BASOPHILS NFR BLD AUTO: 0.3 % (ref 0–1.5)
BILIRUB SERPL-MCNC: 0.5 MG/DL (ref 0–1.2)
BUN SERPL-MCNC: 11 MG/DL (ref 8–23)
BUN/CREAT SERPL: 14.1 (ref 7–25)
CALCIUM SPEC-SCNC: 8.8 MG/DL (ref 8.6–10.5)
CHLORIDE SERPL-SCNC: 98 MMOL/L (ref 98–107)
CO2 SERPL-SCNC: 23 MMOL/L (ref 22–29)
CREAT SERPL-MCNC: 0.78 MG/DL (ref 0.57–1)
DEPRECATED RDW RBC AUTO: 40.3 FL (ref 37–54)
EGFRCR SERPLBLD CKD-EPI 2021: 76.9 ML/MIN/1.73
EOSINOPHIL # BLD AUTO: 0.15 10*3/MM3 (ref 0–0.4)
EOSINOPHIL NFR BLD AUTO: 1.7 % (ref 0.3–6.2)
ERYTHROCYTE [DISTWIDTH] IN BLOOD BY AUTOMATED COUNT: 12.9 % (ref 12.3–15.4)
GLOBULIN UR ELPH-MCNC: 2.3 GM/DL
GLUCOSE SERPL-MCNC: 121 MG/DL (ref 65–99)
HCT VFR BLD AUTO: 37.2 % (ref 34–46.6)
HGB BLD-MCNC: 12.6 G/DL (ref 12–15.9)
IMM GRANULOCYTES # BLD AUTO: 0.05 10*3/MM3 (ref 0–0.05)
IMM GRANULOCYTES NFR BLD AUTO: 0.6 % (ref 0–0.5)
LYMPHOCYTES # BLD AUTO: 1.69 10*3/MM3 (ref 0.7–3.1)
LYMPHOCYTES NFR BLD AUTO: 18.9 % (ref 19.6–45.3)
MCH RBC QN AUTO: 29.5 PG (ref 26.6–33)
MCHC RBC AUTO-ENTMCNC: 33.9 G/DL (ref 31.5–35.7)
MCV RBC AUTO: 87.1 FL (ref 79–97)
MONOCYTES # BLD AUTO: 1.07 10*3/MM3 (ref 0.1–0.9)
MONOCYTES NFR BLD AUTO: 12 % (ref 5–12)
NEUTROPHILS NFR BLD AUTO: 5.96 10*3/MM3 (ref 1.7–7)
NEUTROPHILS NFR BLD AUTO: 66.5 % (ref 42.7–76)
NRBC BLD AUTO-RTO: 0 /100 WBC (ref 0–0.2)
PLATELET # BLD AUTO: 246 10*3/MM3 (ref 140–450)
PMV BLD AUTO: 10.2 FL (ref 6–12)
POTASSIUM SERPL-SCNC: 3.8 MMOL/L (ref 3.5–5.2)
PROT SERPL-MCNC: 5.9 G/DL (ref 6–8.5)
RBC # BLD AUTO: 4.27 10*6/MM3 (ref 3.77–5.28)
SODIUM SERPL-SCNC: 130 MMOL/L (ref 136–145)
WBC NRBC COR # BLD AUTO: 8.95 10*3/MM3 (ref 3.4–10.8)

## 2023-11-21 PROCEDURE — 25010000002 MORPHINE PER 10 MG: Performed by: ORTHOPAEDIC SURGERY

## 2023-11-21 PROCEDURE — 25010000002 ONDANSETRON PER 1 MG: Performed by: INTERNAL MEDICINE

## 2023-11-21 PROCEDURE — 97530 THERAPEUTIC ACTIVITIES: CPT

## 2023-11-21 PROCEDURE — 85025 COMPLETE CBC W/AUTO DIFF WBC: CPT | Performed by: INTERNAL MEDICINE

## 2023-11-21 PROCEDURE — 97116 GAIT TRAINING THERAPY: CPT

## 2023-11-21 PROCEDURE — G0378 HOSPITAL OBSERVATION PER HR: HCPCS

## 2023-11-21 PROCEDURE — 97161 PT EVAL LOW COMPLEX 20 MIN: CPT

## 2023-11-21 PROCEDURE — 80053 COMPREHEN METABOLIC PANEL: CPT | Performed by: INTERNAL MEDICINE

## 2023-11-21 RX ORDER — HYDROCODONE BITARTRATE AND ACETAMINOPHEN 5; 325 MG/1; MG/1
1 TABLET ORAL ONCE AS NEEDED
Status: DISCONTINUED | OUTPATIENT
Start: 2023-11-21 | End: 2023-11-21 | Stop reason: HOSPADM

## 2023-11-21 RX ORDER — LOSARTAN POTASSIUM 50 MG/1
25 TABLET ORAL DAILY
Start: 2023-11-21

## 2023-11-21 RX ORDER — HYDROCODONE BITARTRATE AND ACETAMINOPHEN 5; 325 MG/1; MG/1
1 TABLET ORAL EVERY 4 HOURS PRN
Qty: 30 TABLET | Refills: 0 | Status: SHIPPED | OUTPATIENT
Start: 2023-11-21

## 2023-11-21 RX ADMIN — DOCUSATE SODIUM 50MG AND SENNOSIDES 8.6MG 2 TABLET: 8.6; 5 TABLET, FILM COATED ORAL at 08:11

## 2023-11-21 RX ADMIN — HYDROCODONE BITARTRATE AND ACETAMINOPHEN 1 TABLET: 5; 325 TABLET ORAL at 12:54

## 2023-11-21 RX ADMIN — PAROXETINE HYDROCHLORIDE HEMIHYDRATE 20 MG: 20 TABLET, FILM COATED ORAL at 06:11

## 2023-11-21 RX ADMIN — ONDANSETRON 4 MG: 2 INJECTION INTRAMUSCULAR; INTRAVENOUS at 04:01

## 2023-11-21 RX ADMIN — ISOSORBIDE DINITRATE 20 MG: 20 TABLET ORAL at 08:11

## 2023-11-21 RX ADMIN — MORPHINE SULFATE 1 MG: 2 INJECTION, SOLUTION INTRAMUSCULAR; INTRAVENOUS at 05:48

## 2023-11-21 RX ADMIN — HYDROCODONE BITARTRATE AND ACETAMINOPHEN 1 TABLET: 5; 325 TABLET ORAL at 08:18

## 2023-11-21 RX ADMIN — RANOLAZINE 500 MG: 500 TABLET, FILM COATED, EXTENDED RELEASE ORAL at 08:11

## 2023-11-21 RX ADMIN — LOSARTAN POTASSIUM 50 MG: 50 TABLET, FILM COATED ORAL at 08:11

## 2023-11-21 RX ADMIN — METOPROLOL SUCCINATE 100 MG: 100 TABLET, EXTENDED RELEASE ORAL at 08:11

## 2023-11-21 RX ADMIN — MORPHINE SULFATE 1 MG: 2 INJECTION, SOLUTION INTRAMUSCULAR; INTRAVENOUS at 01:54

## 2023-11-21 RX ADMIN — Medication 10 ML: at 08:21

## 2023-11-21 RX ADMIN — HYDROCODONE BITARTRATE AND ACETAMINOPHEN 1 TABLET: 5; 325 TABLET ORAL at 04:01

## 2023-11-21 NOTE — CONSULTS
I did receive a message from the emergency department that the patient has a left humerus shaft fracture following a history of fall.    She is known to me from her previous orthopedic intervention for fractures by me and to our group from her proximal humerus fracture fixation.    Family has requested for me.  I did call the emergency department and I have seen the patient on the floor.    Secondary to scheduling conflicts I will not be able to take care of her fracture during this admission.  Dr. Haque my partner is on-call.  He is going to see the patient and make a consult and plan appropriately.

## 2023-11-21 NOTE — THERAPY EVALUATION
Patient Name: Elena Siddiqui  : 1945    MRN: [id number]                              Today's Date: 2023       Admit Date: 2023    Visit Dx:     ICD-10-CM ICD-9-CM   1. Closed displaced transverse fracture of shaft of left humerus, initial encounter  S42.322A 812.21   2. Frequent falls  R29.6 V15.88     Patient Active Problem List   Diagnosis    Visual changes    Headache    Aneurysm    S/P ORIF (open reduction internal fixation) fracture    Humerus fracture    Abnormal brain MRI    Closed fracture of lower end of radius and ulna, right, initial encounter    Frequent falls    Hypertension    Hypercholesteremia    Humerus shaft fracture     Past Medical History:   Diagnosis Date    Aneurysm     cerebral, monitored by Dr. Hurley... LAST CHECK 18...  RECENT CT ON FILE FROM FALL    Balance problems     Depression     Frequent falls     Frequent UTI     Hammer toe of left foot     Headaches, cluster     Hypercholesteremia     Hypertension     Hyponatremia     HISTORY OF...    Neck pain     PONV (postoperative nausea and vomiting)     Radius fracture      Past Surgical History:   Procedure Laterality Date    BUNIONECTOMY Left     HAMMER TOE REPAIR Left 2018    Procedure: LEFT 2ND HAMMER TOE CORRECTION  CORRECTION OF ANGULAR DEFORMITY;  Surgeon: Miles Brown Jr., MD;  Location: [facility];  Service:     HIP ARTHROPLASTY Left     KNEE ARTHROPLASTY Bilateral     ORIF HUMERUS FRACTURE Left 2016    Procedure: LEFT PROXIMAL HUMERUS  OPEN REDUCTION  INTERNAL FIXATION ;  Surgeon: Alfonso Troy MD;  Location: [facility];  Service:     ORIF WRIST FRACTURE Right 2019    Procedure: ULNA/RADIUS OPEN REDUCTION INTERNAL FIXATION;  Surgeon: Raegan Mcgee MD;  Location: [facility] OR;  Service: Orthopedics    OTHER SURGICAL HISTORY      Arm surgery (left)    TOE SURGERY Left     hammer toe      General Information       Row Name 23 4316          Physical Therapy Time and  Intention    Document Type evaluation  -CS     Mode of Treatment individual therapy;physical therapy  -CS       Row Name 11/21/23 1436          General Information    Patient Profile Reviewed yes  -CS     Prior Level of Function independent:;gait;transfer;bed mobility  -CS     Existing Precautions/Restrictions fall;left;non-weight bearing  L UE NWB  -CS     Barriers to Rehab none identified  -CS       Row Name 11/21/23 1436          Living Environment    People in Home spouse  -CS       Row Name 11/21/23 1436          Home Main Entrance    Number of Stairs, Main Entrance three  -CS     Stair Railings, Main Entrance railings safe and in good condition  -CS       Row Name 11/21/23 1436          Stairs Within Home, Primary    Number of Stairs, Within Home, Primary none  -CS       Row Name 11/21/23 1436          Cognition    Orientation Status (Cognition) oriented x 3  -CS       Row Name 11/21/23 1436          Safety Issues, Functional Mobility    Impairments Affecting Function (Mobility) endurance/activity tolerance;pain;range of motion (ROM);strength  -CS               User Key  (r) = Recorded By, (t) = Taken By, (c) = Cosigned By      Initials Name Provider Type    CS Ivon Lorenzana, PT Physical Therapist                   Mobility       Row Name 11/21/23 1438          Bed Mobility    Bed Mobility supine-sit;sit-supine  -CS     Supine-Sit [place] (Bed Mobility) minimum assist (75% patient effort)  -CS     Sit-Supine [place] (Bed Mobility) standby assist  -CS       Row Name 11/21/23 1438          Sit-Stand Transfer    Sit-Stand [place] (Transfers) contact guard  -CS     Assistive Device (Sit-Stand Transfers) other (see comments)  NO AD  -CS       Row Name 11/21/23 1438          Gait/Stairs (Locomotion)    [place] Level (Gait) contact guard  -CS     Assistive Device (Gait) other (see comments)  NO AD  -CS     Distance in Feet (Gait) 50'  -CS     Deviations/Abnormal Patterns (Gait) mera  decreased;gait speed decreased;stride length decreased  -     [place] Level (Stairs) not tested  -CS     Comment, (Gait/Stairs) slow pace; no overt LOB  -CS       Row Name 11/21/23 1438          Mobility    Extremity Weight-bearing Status left upper extremity  -CS     Left Upper Extremity (Weight-bearing Status) non weight-bearing (NWB)  -CS               User Key  (r) = Recorded By, (t) = Taken By, (c) = Cosigned By      Initials Name Provider Type    CS Ivon Lorenzana, PT Physical Therapist                   Obj/Interventions       Row Name 11/21/23 1439          Range of Motion Comprehensive    General Range of Motion bilateral lower extremity ROM WFL  -CS       Row Name 11/21/23 1439          Strength Comprehensive (MMT)    General Manual Muscle Testing (MMT) Assessment other (see comments)  -CS     Comment, General Manual Muscle Testing (MMT) Assessment generalized weakness  -CS       Row Name 11/21/23 1439          Balance    Balance Assessment sitting static balance;sitting dynamic balance;standing static balance;standing dynamic balance  -CS     Static Sitting Balance standby assist  -CS     Dynamic Sitting Balance standby assist  -CS     Position, Sitting Balance unsupported;sitting edge of bed  -CS     Static Standing Balance standby assist  -CS     Dynamic Standing Balance contact guard  -CS     Position/Device Used, Standing Balance unsupported  -CS               User Key  (r) = Recorded By, (t) = Taken By, (c) = Cosigned By      Initials Name Provider Type    Ivon Lynn, PT Physical Therapist                   Goals/Plan    No documentation.                  Clinical Impression       Row Name 11/21/23 1440          Pain    Pretreatment Pain Rating 7/10  -CS     Posttreatment Pain Rating 7/10  -CS     Pain Location - Side/Orientation Left  -CS     Pain Location upper  -CS     Pain Location - extremity  -CS     Pain Intervention(s) Rest;Repositioned  -       Row Name 11/21/23 9262           Plan of Care Review    Plan of Care Reviewed With patient;spouse  -CS     Outcome Evaluation Pt is a 79 y/o F admitted after a mechanical fall with left humerus midshaft closed fracture with displacement. Per ortho pt is to follow up with Monday for surgery - NWB with splint. Pt reports she lives with her spouse with 3 ROSANNA and is (I) with mobility at BL. Pt presents to PT with expected L UE pain. Pt completed bed mobility with min A for trunk assistance. Pt stood and ambulated 50' c no AD requiring CGA. Pt slightly unsteady but no overt LOB. PT advised pt to use SC when returning home. Splint and sling donned while sitting EOB. Education on donning/doffing received. Pt is safe to D/C home today with assistance. Plan to f/u with HHPT following surgery.  -CS       Row Name 11/21/23 1440          Therapy Assessment/Plan (PT)    Criteria for Skilled Interventions Met (PT) yes;meets criteria  -CS     Therapy Frequency (PT) evaluation only  -CS       Row Name 11/21/23 1440          Positioning and Restraints    Pre-Treatment Position in bed  -CS     Post Treatment Position bed  -CS     In Bed fowlers;call light within reach;encouraged to call for assist;with family/caregiver;with nsg  -CS               User Key  (r) = Recorded By, (t) = Taken By, (c) = Cosigned By      Initials Name Provider Type    Ivon Lynn, PT Physical Therapist                   Outcome Measures       Row Name 11/21/23 1447 11/21/23 0820       How much help from another person do you currently need...    Turning from your back to your side while in flat bed without using bedrails? 3  -CS 3  -NIRANJAN    Moving from lying on back to sitting on the side of a flat bed without bedrails? 3  -CS 3  -NIRANJAN    Moving to and from a bed to a chair (including a wheelchair)? 3  -CS 3  -NIRANJAN    Standing up from a chair using your arms (e.g., wheelchair, bedside chair)? 3  -CS 3  -NIRANJAN    Climbing 3-5 steps with a railing? 3  -CS 2  -NIRANJAN    To walk in hospital room?  3  -CS 2  -NIRANJAN    AM-PAC 6 Clicks Score (PT) 18  -CS 16  -NIRANJAN    Highest Level of Mobility Goal 6 --> Walk 10 steps or more  -CS 5 --> Static standing  -NIRANJAN      Row Name 11/21/23 1447          Functional Assessment    Outcome Measure Options AM-PAC 6 Clicks Basic Mobility (PT)  -CS               User Key  (r) = Recorded By, (t) = Taken By, (c) = Cosigned By      Initials Name Provider Type    NIRANJANAngelo Crowley, RN Registered Nurse    Ivon Lynn, PT Physical Therapist                                 Physical Therapy Education       Title: PT OT SLP Therapies (Resolved)       Topic: Physical Therapy (Resolved)       Point: Mobility training (Resolved)       Learning Progress Summary             Patient Acceptance, E,TB, VU,DU by  at 11/21/2023 1447                         Point: Home exercise program (Resolved)       Learning Progress Summary             Patient Acceptance, E,TB, VU,DU by  at 11/21/2023 1447                         Point: Body mechanics (Resolved)       Learning Progress Summary             Patient Acceptance, E,TB, VU,DU by  at 11/21/2023 1447                         Point: Precautions (Resolved)       Learning Progress Summary             Patient Acceptance, E,TB, VU,DU by  at 11/21/2023 1447                                         User Key       Initials Effective Dates Name Provider Type Discipline     09/22/22 -  Ivon Lorenzana, REDDY Physical Therapist PT                  PT Recommendation and Plan     Plan of Care Reviewed With: patient, spouse  Outcome Evaluation: Pt is a 79 y/o F admitted after a mechanical fall with left humerus midshaft closed fracture with displacement. Per ortho pt is to follow up with Monday for surgery - NWB with splint. Pt reports she lives with her spouse with 3 ROSANNA and is (I) with mobility at . Pt presents to PT with expected L UE pain. Pt completed bed mobility with min A for trunk assistance. Pt stood and ambulated 50' c no AD requiring CGA. Pt  slightly unsteady but no overt LOB. PT advised pt to use SC when returning home. Splint and sling donned while sitting EOB. Education on donning/doffing received. Pt is safe to D/C home today with assistance. Plan to f/u with HHPT following surgery.     Time Calculation:         PT Charges       Row Name 11/21/23 1448             Time Calculation    Start Time 1350  -CS      Stop Time 1425  -CS      Time Calculation (min) 35 min  -CS      PT Received On 11/21/23  -CS         Time Calculation- PT    Total Timed Code Minutes- PT 30 minute(s)  -CS         Timed Charges    70614 - Gait Training Minutes  15  -CS      26513 - PT Therapeutic Activity Minutes 15  -CS         Total Minutes    Timed Charges Total Minutes 30  -CS       Total Minutes 30  -CS                User Key  (r) = Recorded By, (t) = Taken By, (c) = Cosigned By      Initials Name Provider Type    CS Ivon Lorenzana, PT Physical Therapist                  Therapy Charges for Today       Code Description Service Date Service Provider Modifiers Qty    56452298681 HC GAIT TRAINING EA 15 MIN 11/21/2023 Ivon Lorenzana, PT GP 1    44397738490 HC PT THERAPEUTIC ACT EA 15 MIN 11/21/2023 Ivon Lorenzana, PT GP 1    25861181232 HC PT EVAL LOW COMPLEXITY 2 11/21/2023 Ivon Lorenzana, PT GP 1            PT G-Codes  Outcome Measure Options: AM-PAC 6 Clicks Basic Mobility (PT)  AM-PAC 6 Clicks Score (PT): 18  PT Discharge Summary  Anticipated Discharge Disposition (PT): home with assist    Ivon Lorenzana PT  11/21/2023

## 2023-11-21 NOTE — PLAN OF CARE
Goal Outcome Evaluation:  Plan of Care Reviewed With: patient        Progress: no change  Outcome Evaluation: 77 y/o admitted from ER d/t L displaced humerus fx after a fall at home. AOX4. Pt placed in humerus stabilization device and sling per [place]. PT and RN educated pt on falls precautions and managing sling. D/C home till F/U appointment on Monday with Dr. Haque. Meds to bed completed.

## 2023-11-21 NOTE — DISCHARGE SUMMARY
Date of Admission: 11/20/2023  Date of Discharge:  11/21/2023  Primary Care Physician: Duke Carson MD     Discharge Diagnosis:  Active Hospital Problems    Diagnosis  POA    **Humerus shaft fracture [S42.309A]  Yes    Hypertension [I10]  Yes    Frequent falls [R29.6]  Not Applicable    Headache [R51.9]  Yes      Resolved Hospital Problems   No resolved problems to display.       DETAILS OF HOSPITAL STAY     Pertinent Test Results and Procedures Performed    X-ray of the left humerus:  An angulated transverse fracture at the mid humeral shaft is noted, with   about 1.9 cm lateral displacement of the distal fracture fragment. No   other recent fractures are noted. Plate and screw hardware of the   proximal left humerus appears intact.     Hospital Course  This is an 78-year-old female with history of hypertension who presented to the emergency room after a mechanical fall which resulted in a left humerus fracture.  Please see H&P for full details of admission.  Orthopedic surgery evaluated her today and due to the degree of soft tissue swelling they do not recommend proceeding with ORIF during this hospitalization.  Instead they have recommended she go home and they will schedule her to come in as an outpatient for fixation of the fracture once her soft tissue swelling has improved.  Her pain is controlled and she is ambulating independently.  She will be released back home today.    With regards to her hypertension, blood pressure has been running on the lower side likely in part due to addition of hydrocodone.  I have scaled back her antihypertensive regimen for now to accommodate for this.    Physical Exam at Discharge:  General: No acute distress, AAOx3  HEENT: EOMI, PERRL  Cardiovascular: +s1 and s2, RRR  Lungs: No rhonchi or wheezing  Abdomen: soft, nontender  Extremities: Left arm is splinted and wrapped with Ace bandage    Consults:   Consults       Date and Time Order Name Status Description     11/20/2023  2:02 PM [facility] (on-call MD unless specified) Details      11/20/2023 12:13 PM Ortho (on-call MD unless specified) Completed               Condition on Discharge: Stable    Discharge Disposition  Home or Self Care    Discharge Medications     Discharge Medications        New Medications        Instructions Start Date   HYDROcodone-acetaminophen 5-325 MG per tablet  Commonly known as: NORCO   1 tablet, Oral, Every 4 Hours PRN             Changes to Medications        Instructions Start Date   losartan 50 MG tablet  Commonly known as: COZAAR  What changed: how much to take   25 mg, Oral, Daily             Continue These Medications        Instructions Start Date   aspirin 81 MG chewable tablet   81 mg, Oral, Daily      atorvastatin 10 MG tablet  Commonly known as: LIPITOR   10 mg, Oral, Daily      isosorbide dinitrate 20 MG tablet  Commonly known as: ISORDIL   20 mg, Oral, 3 Times Daily      metoprolol succinate  MG 24 hr tablet  Commonly known as: TOPROL-XL   100 mg, Oral, Daily      PARoxetine 20 MG tablet  Commonly known as: PAXIL   20 mg, Oral, Every Morning      ranolazine 500 MG 12 hr tablet  Commonly known as: RANEXA   500 mg, Oral, 2 Times Daily             Stop These Medications      lisinopril 20 MG tablet  Commonly known as: PRINIVIL,ZESTRIL              Discharge Diet:   Diet Instructions       Diet: Regular/House Diet; Regular Texture (IDDSI 7); Thin (IDDSI 0)      Discharge Diet: Regular/House Diet    Texture: Regular Texture (IDDSI 7)    Fluid Consistency: Thin (IDDSI 0)            Activity at Discharge:   Activity Instructions       Activity as Tolerated              Follow-up Appointments  No future appointments.  Additional Instructions for the Follow-ups that You Need to Schedule       Discharge Follow-up with Specified Provider: Dr. Haque of orthopedic surgery.  His office to arrange   As directed      To: Dr. Haque of orthopedic surgery.  His office to arrange                I have  examined and discussed discharge planning with the patient today.    I wore full protective equipment throughout the patient encounter including eye protection and facemask.  Hand hygiene was performed before donning protective equipment and after removal when leaving the room.     Rc Cross MD  11/21/23  09:38 EST    Time: Discharge greater than 30 min

## 2023-11-21 NOTE — CONSULTS
Orthopaedic & Sports Medicine Surgery  Dr. Praful Haque MD  [phone]    Orthopaedic Surgery  Consult Note      HPI:  Patient is a 78 y.o. female who presents with left humeral shaft fracture.  She has a history of a left proximal humerus ORIF with Dr. Troy in 2016.  She held this and then subsequently had a spiral fracture below this treated conservatively which went on to heal.  This morning she was walking in tripped over a flower pot and fell and had pain and deformity of her humerus.  She was brought to [facility] and found to have a humeral shaft fracture.  She had previous surgeries with Dr. Mcgee in the past and asked if he was available, but unfortunately he is leaving town and unable to take care of this fracture at this time, and I was consulted as the on-call provider.  She lives at home with her     MEDICAL HISTORY  Past Medical History:   Diagnosis Date    Aneurysm     cerebral, monitored by Dr. Hurley... LAST CHECK 1/24/18...  RECENT CT ON FILE FROM FALL    Balance problems     Depression     Frequent falls     Frequent UTI     Hammer toe of left foot     Headaches, cluster     Hypercholesteremia     Hypertension     Hyponatremia     HISTORY OF...    Neck pain     PONV (postoperative nausea and vomiting)     Radius fracture      Past Surgical History:   Procedure Laterality Date    BUNIONECTOMY Left     HAMMER TOE REPAIR Left 2/9/2018    Procedure: LEFT 2ND HAMMER TOE CORRECTION  CORRECTION OF ANGULAR DEFORMITY;  Surgeon: Miles Brown Jr., MD;  Location: [facility] OR [facility];  Service:     HIP ARTHROPLASTY Left     KNEE ARTHROPLASTY Bilateral     ORIF HUMERUS FRACTURE Left 12/30/2016    Procedure: LEFT PROXIMAL HUMERUS  OPEN REDUCTION  INTERNAL FIXATION ;  Surgeon: Alfonso Troy MD;  Location: [facility] OR [facility] [place];  Service:     ORIF WRIST FRACTURE Right 7/13/2019    Procedure: ULNA/RADIUS OPEN REDUCTION INTERNAL FIXATION;  Surgeon: Raegan Mcgee MD;  Location: [facility] OR;   Service: Orthopedics    OTHER SURGICAL HISTORY      Arm surgery (left)    TOE SURGERY Left     hammer toe     Prior to Admission medications    Medication Sig Start Date End Date Taking? Authorizing Provider   aspirin 81 MG chewable tablet Chew 1 tablet Daily.   Yes Jaida Reeves MD   atorvastatin (LIPITOR) 10 MG tablet Take 1 tablet by mouth Daily. 5/10/16  Yes Jaida Reeves MD   isosorbide dinitrate (ISORDIL) 20 MG tablet Take 1 tablet by mouth 3 (Three) Times a Day.   Yes Jaida Reeves MD   losartan (COZAAR) 50 MG tablet Take 1 tablet by mouth Daily.   Yes Jaida Reeves MD   metoprolol succinate XL (TOPROL-XL) 100 MG 24 hr tablet Take 1 tablet by mouth Daily.   Yes Jaida Reeves MD   PARoxetine (PAXIL) 20 MG tablet Take 1 tablet by mouth Every Morning.   Yes Jaida Reeves MD   ranolazine (RANEXA) 500 MG 12 hr tablet Take 1 tablet by mouth 2 (Two) Times a Day.   Yes Jaida Reeves MD   lisinopril (PRINIVIL,ZESTRIL) 20 MG tablet Take 1 tablet by mouth Every Night.    Jaida Reeves MD   acetaminophen (TYLENOL) 500 MG tablet Take 1 tablet by mouth Every 6 (Six) Hours As Needed for Mild Pain.  11/20/23  Jaida Reeves MD   ibuprofen (ADVIL,MOTRIN) 200 MG tablet Take 1 tablet by mouth Every 6 (Six) Hours As Needed for Mild Pain.  11/20/23  Jaida Reeves MD     Allergies   Allergen Reactions    Levofloxacin Rash    Sulfa Antibiotics Rash     Most Recent Immunizations   Administered Date(s) Administered    COVID-19 (MODERNA) BIVALENT 12+YRS 10/27/2022    COVID-19 (PFIZER) Purple Cap Monovalent 10/02/2021     Social History     Tobacco Use    Smoking status: Never    Smokeless tobacco: Never   Substance Use Topics    Alcohol use: Yes     Comment: Rarely, special occasions       Social History     Substance and Sexual Activity   Drug Use No       VITALS: /73 (BP Location: Right arm, Patient Position: Lying)   Pulse 62   Temp 98 °F (36.7  "°C) (Oral)   Resp 17   Ht 167.6 cm (66\")   Wt 75.8 kg (167 lb)   SpO2 94%   BMI 26.95 kg/m²  Body mass index is 26.95 kg/m².    PHYSICAL EXAM:   CONSTITUTIONAL: No acute distress  LUNGS: Equal chest rise, no shortness of air  CARDIOVASCULAR: palpable peripheral pulses  EXTREMITY:   Left upper extremity  Tenderness to Palpation:  Arm is a posterior slab splint.  But does appear to be tender around the midshaft of the humerus.  Nontender over the shoulder.  Well-healed anterior deltopectoral incision.  Pulses:  Brisk Capillary Refill  Sensation: Intact to Radial, Median, Ulnar Nerves and grossly throughout extremity  Motor: 5/5 Radial/Ulnar/Median/AIN/PIN Motor Nerves     RADIOLOGY REVIEW:   XR Humerus Left    Result Date: 11/20/2023   Left humerus fracture.    This report was finalized on 11/20/2023 11:28 AM by Dr. Kenny Ricci M.D on Workstation: Robin       LABS:   Results for the past 24 hours:   Recent Results (from the past 24 hour(s))   Comprehensive Metabolic Panel    Collection Time: 11/20/23 12:26 PM    Specimen: Arm, Right; Blood   Result Value Ref Range    Glucose 145 (H) 65 - 99 mg/dL    BUN 12 8 - 23 mg/dL    Creatinine 0.85 0.57 - 1.00 mg/dL    Sodium 132 (L) 136 - 145 mmol/L    Potassium 4.7 3.5 - 5.2 mmol/L    Chloride 99 98 - 107 mmol/L    CO2 23.9 22.0 - 29.0 mmol/L    Calcium 8.9 8.6 - 10.5 mg/dL    Total Protein 6.4 6.0 - 8.5 g/dL    Albumin 4.1 3.5 - 5.2 g/dL    ALT (SGPT) 16 1 - 33 U/L    AST (SGOT) 16 1 - 32 U/L    Alkaline Phosphatase 52 39 - 117 U/L    Total Bilirubin 0.5 0.0 - 1.2 mg/dL    Globulin 2.3 gm/dL    A/G Ratio 1.8 g/dL    BUN/Creatinine Ratio 14.1 7.0 - 25.0    Anion Gap 9.1 5.0 - 15.0 mmol/L    eGFR 69.4 >60.0 mL/min/1.73   Protime-INR    Collection Time: 11/20/23 12:26 PM    Specimen: Arm, Right; Blood   Result Value Ref Range    Protime 14.2 11.7 - 14.2 Seconds    INR 1.09 0.90 - 1.10   CBC Auto Differential    Collection Time: 11/20/23 12:26 PM    Specimen: Arm, " Right; Blood   Result Value Ref Range    WBC 9.96 3.40 - 10.80 10*3/mm3    RBC 4.68 3.77 - 5.28 10*6/mm3    Hemoglobin 14.0 12.0 - 15.9 g/dL    Hematocrit 41.2 34.0 - 46.6 %    MCV 88.0 79.0 - 97.0 fL    MCH 29.9 26.6 - 33.0 pg    MCHC 34.0 31.5 - 35.7 g/dL    RDW 13.1 12.3 - 15.4 %    RDW-SD 41.8 37.0 - 54.0 fl    MPV 10.8 6.0 - 12.0 fL    Platelets 203 140 - 450 10*3/mm3    Neutrophil % 84.5 (H) 42.7 - 76.0 %    Lymphocyte % 7.8 (L) 19.6 - 45.3 %    Monocyte % 6.8 5.0 - 12.0 %    Eosinophil % 0.3 0.3 - 6.2 %    Basophil % 0.2 0.0 - 1.5 %    Immature Grans % 0.4 0.0 - 0.5 %    Neutrophils, Absolute 8.41 (H) 1.70 - 7.00 10*3/mm3    Lymphocytes, Absolute 0.78 0.70 - 3.10 10*3/mm3    Monocytes, Absolute 0.68 0.10 - 0.90 10*3/mm3    Eosinophils, Absolute 0.03 0.00 - 0.40 10*3/mm3    Basophils, Absolute 0.02 0.00 - 0.20 10*3/mm3    Immature Grans, Absolute 0.04 0.00 - 0.05 10*3/mm3    nRBC 0.0 0.0 - 0.2 /100 WBC   Type & Screen    Collection Time: 11/20/23  2:09 PM    Specimen: Blood   Result Value Ref Range    ABO Type O     RH type Positive     Antibody Screen Negative     T&S Expiration Date 11/23/2023 11:59:59 PM    ECG 12 Lead Other; Preoperative    Collection Time: 11/20/23  2:21 PM   Result Value Ref Range    QT Interval 421 ms    QTC Interval 438 ms       IMPRESSION:  Patient is a 78 y.o. female with transverse displaced humeral shaft fracture below a previous spiral humeral shaft fracture treated conservatively, below a previous proximal humerus ORIF with a 3-hole Biomet proximal humerus plate.  There is osteopenia.    PLAN:   Admited to: Vilma Hussein MD  Diet: Okay for diet tonight, n.p.o. at midnight  Weight Bearing: Nonweightbearing left upper extremity in sling.  Labs: Reviewed and as above  Imaging: Impression as above  Anti-coagulation: Mechanical DVT ppx with SCD's, chemical DVT PPX: Hold home ASA 81 mg for surgery.  Float Heels  Incentive Spirometer  Due to the amount of displacement and transverse  nature of this fracture, I think it would likely benefit with operative intervention.  If it does go on to union like this things will likely have deformity.  For now ice and elevate to help with pain and swelling.  Nonweightbearing left upper extremity.  We will discuss surgical timing after checking with OR for what OR time is available.  If unable to do tomorrow we will give the patient a diet.  The risks, benefits, alternatives, and expectations were discussed with the patient and family and they include but not limited to risks of anesthesia, bleeding, DVT, PE, heart attack, stroke, even death, damage to neurovascular structures including but not limited to radial nerve and ulnar nerve, infection, need for repeat surgery, scarring, wound issues, fracture, instability, nikky-implant fracture, and arthritis  to name a few.  They understand and wish to proceed.  Consent was signed and placed on the chart.      Praful Haque MD  [facility]  Orthopaedic Surgery, Sports Medicine  [phone]

## 2023-11-21 NOTE — PLAN OF CARE
Goal Outcome Evaluation:  Plan of Care Reviewed With: patient, spouse           Outcome Evaluation: Pt is a 79 y/o F admitted after a mechanical fall with left humerus midshaft closed fracture with displacement. Per ortho pt is to follow up with Monday for surgery - NWB with splint. Pt reports she lives with her spouse with 3 ROSANNA and is (I) with mobility at BL. Pt presents to PT with expected L UE pain. Pt completed bed mobility with min A for trunk assistance. Pt stood and ambulated 50' c no AD requiring CGA. Pt slightly unsteady but no overt LOB. PT advised pt to use SC when returning home. Splint and sling donned while sitting EOB. Education on donning/doffing received. Pt is safe to D/C home today with assistance. Plan to f/u with HHPT following surgery.      Anticipated Discharge Disposition (PT): home with assist

## 2023-11-21 NOTE — PLAN OF CARE
Goal Outcome Evaluation:  Plan of Care Reviewed With: patient        Progress: no change  Outcome Evaluation: Patient here with left humerus fracture.  Vss. IV  pain medication helping with pain. Acewrap and splint to left hand. Neuro check WNL. Mild edema to left hand. Bedrest. NPO midnight  for possible surgery today. Purewick in place. No complain of nausea at this time. Education provided on pain control and blood pressure monitoring.Patient verbalized understanding.

## 2023-11-22 NOTE — CASE MANAGEMENT/SOCIAL WORK
Case Management Discharge Note      Final Note: Home.         Selected Continued Care - Discharged on 11/21/2023 Admission date: 11/20/2023 - Discharge disposition: Home or Self Care      Destination    No services have been selected for the patient.                Durable Medical Equipment    No services have been selected for the patient.                Dialysis/Infusion    No services have been selected for the patient.                Home Medical Care    No services have been selected for the patient.                Therapy    No services have been selected for the patient.                Community Resources    No services have been selected for the patient.                Community & DME    No services have been selected for the patient.                         Final Discharge Disposition Code: 01 - home or self-care

## 2024-01-03 ENCOUNTER — TRANSCRIBE ORDERS (OUTPATIENT)
Dept: ADMINISTRATIVE | Facility: HOSPITAL | Age: 79
End: 2024-01-03
Payer: MEDICARE

## 2024-01-03 ENCOUNTER — HOSPITAL ENCOUNTER (OUTPATIENT)
Dept: CARDIOLOGY | Facility: HOSPITAL | Age: 79
Discharge: HOME | End: 2024-01-03
Payer: MEDICARE

## 2024-01-03 ENCOUNTER — LAB (OUTPATIENT)
Dept: LAB | Facility: HOSPITAL | Age: 79
End: 2024-01-03
Payer: MEDICARE

## 2024-01-03 ENCOUNTER — HOSPITAL ENCOUNTER (OUTPATIENT)
Dept: GENERAL RADIOLOGY | Facility: HOSPITAL | Age: 79
Discharge: HOME | End: 2024-01-03
Payer: MEDICARE

## 2024-01-03 DIAGNOSIS — Z01.818 PREOP TESTING: ICD-10-CM

## 2024-01-03 DIAGNOSIS — Z01.818 PRE-OP EXAM: Primary | ICD-10-CM

## 2024-01-03 DIAGNOSIS — Z01.818 PRE-OP EXAM: ICD-10-CM

## 2024-01-03 LAB
ALBUMIN SERPL-MCNC: 4 G/DL (ref 3.5–5.2)
ALBUMIN/GLOB SERPL: 1.5 G/DL
ALP SERPL-CCNC: 66 U/L (ref 39–117)
ALT SERPL W P-5'-P-CCNC: 10 U/L (ref 1–33)
ANION GAP SERPL CALCULATED.3IONS-SCNC: 10.8 MMOL/L (ref 5–15)
AST SERPL-CCNC: 13 U/L (ref 1–32)
BASOPHILS # BLD AUTO: 0.04 10*3/MM3 (ref 0–0.2)
BASOPHILS NFR BLD AUTO: 0.6 % (ref 0–1.5)
BILIRUB SERPL-MCNC: 0.5 MG/DL (ref 0–1.2)
BUN SERPL-MCNC: 11 MG/DL (ref 8–23)
BUN/CREAT SERPL: 13.3 (ref 7–25)
CALCIUM SPEC-SCNC: 9.4 MG/DL (ref 8.6–10.5)
CHLORIDE SERPL-SCNC: 98 MMOL/L (ref 98–107)
CO2 SERPL-SCNC: 25.2 MMOL/L (ref 22–29)
CREAT SERPL-MCNC: 0.83 MG/DL (ref 0.57–1)
DEPRECATED RDW RBC AUTO: 43.5 FL (ref 37–54)
EGFRCR SERPLBLD CKD-EPI 2021: 71.4 ML/MIN/1.73
EOSINOPHIL # BLD AUTO: 0.14 10*3/MM3 (ref 0–0.4)
EOSINOPHIL NFR BLD AUTO: 2 % (ref 0.3–6.2)
ERYTHROCYTE [DISTWIDTH] IN BLOOD BY AUTOMATED COUNT: 13.1 % (ref 12.3–15.4)
GLOBULIN UR ELPH-MCNC: 2.7 GM/DL
GLUCOSE SERPL-MCNC: 107 MG/DL (ref 65–99)
HCT VFR BLD AUTO: 41.6 % (ref 34–46.6)
HGB BLD-MCNC: 13.8 G/DL (ref 12–15.9)
IMM GRANULOCYTES # BLD AUTO: 0.02 10*3/MM3 (ref 0–0.05)
IMM GRANULOCYTES NFR BLD AUTO: 0.3 % (ref 0–0.5)
LYMPHOCYTES # BLD AUTO: 1.24 10*3/MM3 (ref 0.7–3.1)
LYMPHOCYTES NFR BLD AUTO: 17.9 % (ref 19.6–45.3)
MCH RBC QN AUTO: 29.9 PG (ref 26.6–33)
MCHC RBC AUTO-ENTMCNC: 33.2 G/DL (ref 31.5–35.7)
MCV RBC AUTO: 90 FL (ref 79–97)
MONOCYTES # BLD AUTO: 0.69 10*3/MM3 (ref 0.1–0.9)
MONOCYTES NFR BLD AUTO: 10 % (ref 5–12)
NEUTROPHILS NFR BLD AUTO: 4.78 10*3/MM3 (ref 1.7–7)
NEUTROPHILS NFR BLD AUTO: 69.2 % (ref 42.7–76)
NRBC BLD AUTO-RTO: 0 /100 WBC (ref 0–0.2)
PLATELET # BLD AUTO: 266 10*3/MM3 (ref 140–450)
PMV BLD AUTO: 10.8 FL (ref 6–12)
POTASSIUM SERPL-SCNC: 4.1 MMOL/L (ref 3.5–5.2)
PROT SERPL-MCNC: 6.7 G/DL (ref 6–8.5)
QT INTERVAL: 422 MS
QTC INTERVAL: 429 MS
RBC # BLD AUTO: 4.62 10*6/MM3 (ref 3.77–5.28)
SODIUM SERPL-SCNC: 134 MMOL/L (ref 136–145)
WBC NRBC COR # BLD AUTO: 6.91 10*3/MM3 (ref 3.4–10.8)

## 2024-01-03 PROCEDURE — 36415 COLL VENOUS BLD VENIPUNCTURE: CPT

## 2024-01-03 PROCEDURE — 93005 ELECTROCARDIOGRAM TRACING: CPT | Performed by: STUDENT IN AN ORGANIZED HEALTH CARE EDUCATION/TRAINING PROGRAM

## 2024-01-03 PROCEDURE — 80053 COMPREHEN METABOLIC PANEL: CPT

## 2024-01-03 PROCEDURE — 85025 COMPLETE CBC W/AUTO DIFF WBC: CPT

## 2024-01-03 PROCEDURE — 71046 X-RAY EXAM CHEST 2 VIEWS: CPT

## 2024-01-05 ENCOUNTER — APPOINTMENT (OUTPATIENT)
Dept: GENERAL RADIOLOGY | Facility: HOSPITAL | Age: 79
End: 2024-01-05
Payer: MEDICARE

## 2024-01-05 ENCOUNTER — ANESTHESIA EVENT (OUTPATIENT)
Dept: PERIOP | Facility: HOSPITAL | Age: 79
End: 2024-01-05
Payer: MEDICARE

## 2024-01-05 ENCOUNTER — ANESTHESIA (OUTPATIENT)
Dept: PERIOP | Facility: HOSPITAL | Age: 79
End: 2024-01-05
Payer: MEDICARE

## 2024-01-05 ENCOUNTER — HOSPITAL ENCOUNTER (OUTPATIENT)
Facility: HOSPITAL | Age: 79
Setting detail: HOSPITAL OUTPATIENT SURGERY
Discharge: HOME | End: 2024-01-05
Attending: STUDENT IN AN ORGANIZED HEALTH CARE EDUCATION/TRAINING PROGRAM | Admitting: STUDENT IN AN ORGANIZED HEALTH CARE EDUCATION/TRAINING PROGRAM
Payer: MEDICARE

## 2024-01-05 VITALS
WEIGHT: 167.33 LBS | HEART RATE: 84 BPM | OXYGEN SATURATION: 95 % | RESPIRATION RATE: 16 BRPM | BODY MASS INDEX: 27.01 KG/M2 | SYSTOLIC BLOOD PRESSURE: 124 MMHG | TEMPERATURE: 97.9 F | DIASTOLIC BLOOD PRESSURE: 61 MMHG

## 2024-01-05 DIAGNOSIS — S42.322G CLOSED DISPLACED TRANSVERSE FRACTURE OF SHAFT OF LEFT HUMERUS WITH DELAYED HEALING, SUBSEQUENT ENCOUNTER: Primary | ICD-10-CM

## 2024-01-05 PROCEDURE — 76000 FLUOROSCOPY <1 HR PHYS/QHP: CPT

## 2024-01-05 PROCEDURE — 25010000002 MIDAZOLAM PER 1 MG: Performed by: ANESTHESIOLOGY

## 2024-01-05 PROCEDURE — 25810000003 LACTATED RINGERS PER 1000 ML: Performed by: ANESTHESIOLOGY

## 2024-01-05 PROCEDURE — 25010000002 DROPERIDOL PER 5 MG

## 2024-01-05 PROCEDURE — 25010000002 MAGNESIUM SULFATE PER 500 MG OF MAGNESIUM

## 2024-01-05 PROCEDURE — 25010000002 ONDANSETRON PER 1 MG: Performed by: NURSE ANESTHETIST, CERTIFIED REGISTERED

## 2024-01-05 PROCEDURE — C1713 ANCHOR/SCREW BN/BN,TIS/BN: HCPCS | Performed by: STUDENT IN AN ORGANIZED HEALTH CARE EDUCATION/TRAINING PROGRAM

## 2024-01-05 PROCEDURE — 25010000002 HYDRALAZINE PER 20 MG

## 2024-01-05 PROCEDURE — 25010000002 PROPOFOL 200 MG/20ML EMULSION

## 2024-01-05 PROCEDURE — 25010000002 PROPOFOL 10 MG/ML EMULSION

## 2024-01-05 PROCEDURE — 25010000002 HYDROMORPHONE PER 4 MG

## 2024-01-05 PROCEDURE — 73060 X-RAY EXAM OF HUMERUS: CPT

## 2024-01-05 PROCEDURE — 25010000002 ONDANSETRON PER 1 MG

## 2024-01-05 PROCEDURE — 25010000002 DEXAMETHASONE PER 1 MG: Performed by: ANESTHESIOLOGY

## 2024-01-05 PROCEDURE — 25010000002 VANCOMYCIN 1 G RECONSTITUTED SOLUTION: Performed by: STUDENT IN AN ORGANIZED HEALTH CARE EDUCATION/TRAINING PROGRAM

## 2024-01-05 PROCEDURE — 25010000002 CEFAZOLIN IN DEXTROSE 2000 MG/ 100 ML SOLUTION: Performed by: STUDENT IN AN ORGANIZED HEALTH CARE EDUCATION/TRAINING PROGRAM

## 2024-01-05 PROCEDURE — 25010000002 ROPIVACAINE PER 1 MG: Performed by: ANESTHESIOLOGY

## 2024-01-05 PROCEDURE — 25010000002 SUGAMMADEX 200 MG/2ML SOLUTION: Performed by: STUDENT IN AN ORGANIZED HEALTH CARE EDUCATION/TRAINING PROGRAM

## 2024-01-05 DEVICE — EVOS 3.5MM X 20MM LOCKING SCREW SELF-TAPPING
Type: IMPLANTABLE DEVICE | Site: HUMERUS | Status: FUNCTIONAL
Brand: EVOS

## 2024-01-05 DEVICE — EVOS 3.5MM LOCKING COMPRESSION                                    PLATE 12 HOLE 139MM
Type: IMPLANTABLE DEVICE | Site: HUMERUS | Status: FUNCTIONAL
Brand: EVOS

## 2024-01-05 DEVICE — EVOS 3.5MM X 20MM CORTEX SCREW SELF-TAPPING
Type: IMPLANTABLE DEVICE | Site: HUMERUS | Status: FUNCTIONAL
Brand: EVOS

## 2024-01-05 DEVICE — EVOS 3.5MM X 24MM CORTEX SCREW SELF-TAPPING
Type: IMPLANTABLE DEVICE | Site: HUMERUS | Status: FUNCTIONAL
Brand: EVOS

## 2024-01-05 DEVICE — EVOS 3.5MM X 16MM LOCKING SCREW SELF-TAPPING
Type: IMPLANTABLE DEVICE | Site: HUMERUS | Status: FUNCTIONAL
Brand: EVOS

## 2024-01-05 DEVICE — EVOS 3.5MM X 22MM LOCKING SCREW SELF-TAPPING
Type: IMPLANTABLE DEVICE | Site: HUMERUS | Status: FUNCTIONAL
Brand: EVOS

## 2024-01-05 DEVICE — EVOS 3.5MM X 28MM LOCKING SCREW SELF-TAPPING
Type: IMPLANTABLE DEVICE | Site: HUMERUS | Status: FUNCTIONAL
Brand: EVOS

## 2024-01-05 RX ORDER — LIDOCAINE HYDROCHLORIDE 10 MG/ML
0.5 INJECTION, SOLUTION INFILTRATION; PERINEURAL ONCE AS NEEDED
Status: DISCONTINUED | OUTPATIENT
Start: 2024-01-05 | End: 2024-01-05 | Stop reason: HOSPADM

## 2024-01-05 RX ORDER — PROMETHAZINE HYDROCHLORIDE 25 MG/1
25 TABLET ORAL ONCE AS NEEDED
Status: DISCONTINUED | OUTPATIENT
Start: 2024-01-05 | End: 2024-01-05 | Stop reason: HOSPADM

## 2024-01-05 RX ORDER — SODIUM CHLORIDE 0.9 % (FLUSH) 0.9 %
3 SYRINGE (ML) INJECTION EVERY 12 HOURS SCHEDULED
Status: DISCONTINUED | OUTPATIENT
Start: 2024-01-05 | End: 2024-01-05 | Stop reason: HOSPADM

## 2024-01-05 RX ORDER — ROPIVACAINE HYDROCHLORIDE 5 MG/ML
INJECTION, SOLUTION EPIDURAL; INFILTRATION; PERINEURAL
Status: COMPLETED | OUTPATIENT
Start: 2024-01-05 | End: 2024-01-05

## 2024-01-05 RX ORDER — MIDAZOLAM HYDROCHLORIDE 1 MG/ML
0.5 INJECTION INTRAMUSCULAR; INTRAVENOUS
Status: DISCONTINUED | OUTPATIENT
Start: 2024-01-05 | End: 2024-01-05 | Stop reason: HOSPADM

## 2024-01-05 RX ORDER — DROPERIDOL 2.5 MG/ML
INJECTION, SOLUTION INTRAMUSCULAR; INTRAVENOUS AS NEEDED
Status: DISCONTINUED | OUTPATIENT
Start: 2024-01-05 | End: 2024-01-05 | Stop reason: SURG

## 2024-01-05 RX ORDER — ONDANSETRON HYDROCHLORIDE 8 MG/1
8 TABLET, FILM COATED ORAL EVERY 8 HOURS PRN
Qty: 30 TABLET | Refills: 0 | Status: SHIPPED | OUTPATIENT
Start: 2024-01-05

## 2024-01-05 RX ORDER — HYDRALAZINE HYDROCHLORIDE 20 MG/ML
INJECTION INTRAMUSCULAR; INTRAVENOUS AS NEEDED
Status: DISCONTINUED | OUTPATIENT
Start: 2024-01-05 | End: 2024-01-05 | Stop reason: SURG

## 2024-01-05 RX ORDER — CEFAZOLIN SODIUM 2 G/100ML
2000 INJECTION, SOLUTION INTRAVENOUS ONCE
Status: COMPLETED | OUTPATIENT
Start: 2024-01-05 | End: 2024-01-05

## 2024-01-05 RX ORDER — HYDROCODONE BITARTRATE AND ACETAMINOPHEN 5; 325 MG/1; MG/1
1 TABLET ORAL ONCE AS NEEDED
Status: DISCONTINUED | OUTPATIENT
Start: 2024-01-05 | End: 2024-01-05 | Stop reason: HOSPADM

## 2024-01-05 RX ORDER — EPHEDRINE SULFATE 50 MG/ML
5 INJECTION, SOLUTION INTRAVENOUS ONCE AS NEEDED
Status: DISCONTINUED | OUTPATIENT
Start: 2024-01-05 | End: 2024-01-05 | Stop reason: HOSPADM

## 2024-01-05 RX ORDER — DROPERIDOL 2.5 MG/ML
0.62 INJECTION, SOLUTION INTRAMUSCULAR; INTRAVENOUS
Status: DISCONTINUED | OUTPATIENT
Start: 2024-01-05 | End: 2024-01-05 | Stop reason: HOSPADM

## 2024-01-05 RX ORDER — IPRATROPIUM BROMIDE AND ALBUTEROL SULFATE 2.5; .5 MG/3ML; MG/3ML
3 SOLUTION RESPIRATORY (INHALATION) ONCE AS NEEDED
Status: DISCONTINUED | OUTPATIENT
Start: 2024-01-05 | End: 2024-01-05 | Stop reason: HOSPADM

## 2024-01-05 RX ORDER — ACETAMINOPHEN 500 MG
1000 TABLET ORAL ONCE
Status: COMPLETED | OUTPATIENT
Start: 2024-01-05 | End: 2024-01-05

## 2024-01-05 RX ORDER — TRANEXAMIC ACID 100 MG/ML
INJECTION, SOLUTION INTRAVENOUS AS NEEDED
Status: DISCONTINUED | OUTPATIENT
Start: 2024-01-05 | End: 2024-01-05 | Stop reason: SURG

## 2024-01-05 RX ORDER — PHENYLEPHRINE HCL IN 0.9% NACL 1 MG/10 ML
SYRINGE (ML) INTRAVENOUS AS NEEDED
Status: DISCONTINUED | OUTPATIENT
Start: 2024-01-05 | End: 2024-01-05 | Stop reason: SURG

## 2024-01-05 RX ORDER — ROCURONIUM BROMIDE 10 MG/ML
INJECTION, SOLUTION INTRAVENOUS AS NEEDED
Status: DISCONTINUED | OUTPATIENT
Start: 2024-01-05 | End: 2024-01-05 | Stop reason: SURG

## 2024-01-05 RX ORDER — ACETAMINOPHEN 500 MG
1000 TABLET ORAL EVERY 8 HOURS
Qty: 180 TABLET | Refills: 1 | Status: SHIPPED | OUTPATIENT
Start: 2024-01-05 | End: 2024-03-05

## 2024-01-05 RX ORDER — HYDROCODONE BITARTRATE AND ACETAMINOPHEN 7.5; 325 MG/1; MG/1
1 TABLET ORAL EVERY 4 HOURS PRN
Status: DISCONTINUED | OUTPATIENT
Start: 2024-01-05 | End: 2024-01-05 | Stop reason: HOSPADM

## 2024-01-05 RX ORDER — HYDRALAZINE HYDROCHLORIDE 20 MG/ML
5 INJECTION INTRAMUSCULAR; INTRAVENOUS
Status: DISCONTINUED | OUTPATIENT
Start: 2024-01-05 | End: 2024-01-05 | Stop reason: HOSPADM

## 2024-01-05 RX ORDER — LABETALOL HYDROCHLORIDE 5 MG/ML
5 INJECTION, SOLUTION INTRAVENOUS
Status: DISCONTINUED | OUTPATIENT
Start: 2024-01-05 | End: 2024-01-05 | Stop reason: HOSPADM

## 2024-01-05 RX ORDER — FENTANYL CITRATE 50 UG/ML
25 INJECTION, SOLUTION INTRAMUSCULAR; INTRAVENOUS
Status: DISCONTINUED | OUTPATIENT
Start: 2024-01-05 | End: 2024-01-05 | Stop reason: HOSPADM

## 2024-01-05 RX ORDER — SODIUM CHLORIDE, SODIUM LACTATE, POTASSIUM CHLORIDE, CALCIUM CHLORIDE 600; 310; 30; 20 MG/100ML; MG/100ML; MG/100ML; MG/100ML
9 INJECTION, SOLUTION INTRAVENOUS CONTINUOUS
Status: DISCONTINUED | OUTPATIENT
Start: 2024-01-05 | End: 2024-01-05 | Stop reason: HOSPADM

## 2024-01-05 RX ORDER — DEXAMETHASONE SODIUM PHOSPHATE 4 MG/ML
INJECTION, SOLUTION INTRA-ARTICULAR; INTRALESIONAL; INTRAMUSCULAR; INTRAVENOUS; SOFT TISSUE AS NEEDED
Status: DISCONTINUED | OUTPATIENT
Start: 2024-01-05 | End: 2024-01-05 | Stop reason: SURG

## 2024-01-05 RX ORDER — NALOXONE HCL 0.4 MG/ML
0.2 VIAL (ML) INJECTION AS NEEDED
Status: DISCONTINUED | OUTPATIENT
Start: 2024-01-05 | End: 2024-01-05 | Stop reason: HOSPADM

## 2024-01-05 RX ORDER — LIDOCAINE HYDROCHLORIDE 20 MG/ML
INJECTION, SOLUTION INFILTRATION; PERINEURAL AS NEEDED
Status: DISCONTINUED | OUTPATIENT
Start: 2024-01-05 | End: 2024-01-05 | Stop reason: SURG

## 2024-01-05 RX ORDER — PROPOFOL 10 MG/ML
INJECTION, EMULSION INTRAVENOUS AS NEEDED
Status: DISCONTINUED | OUTPATIENT
Start: 2024-01-05 | End: 2024-01-05 | Stop reason: SURG

## 2024-01-05 RX ORDER — OXYCODONE HYDROCHLORIDE 5 MG/1
5 TABLET ORAL EVERY 4 HOURS PRN
Qty: 30 TABLET | Refills: 0 | Status: SHIPPED | OUTPATIENT
Start: 2024-01-05

## 2024-01-05 RX ORDER — FENTANYL CITRATE 50 UG/ML
25 INJECTION, SOLUTION INTRAMUSCULAR; INTRAVENOUS ONCE AS NEEDED
Status: DISCONTINUED | OUTPATIENT
Start: 2024-01-05 | End: 2024-01-05 | Stop reason: HOSPADM

## 2024-01-05 RX ORDER — MAGNESIUM HYDROXIDE 1200 MG/15ML
LIQUID ORAL AS NEEDED
Status: DISCONTINUED | OUTPATIENT
Start: 2024-01-05 | End: 2024-01-05 | Stop reason: HOSPADM

## 2024-01-05 RX ORDER — SODIUM CHLORIDE 0.9 % (FLUSH) 0.9 %
3-10 SYRINGE (ML) INJECTION AS NEEDED
Status: DISCONTINUED | OUTPATIENT
Start: 2024-01-05 | End: 2024-01-05 | Stop reason: HOSPADM

## 2024-01-05 RX ORDER — PROMETHAZINE HYDROCHLORIDE 25 MG/1
25 SUPPOSITORY RECTAL ONCE AS NEEDED
Status: DISCONTINUED | OUTPATIENT
Start: 2024-01-05 | End: 2024-01-05 | Stop reason: HOSPADM

## 2024-01-05 RX ORDER — VANCOMYCIN HYDROCHLORIDE 1 G/20ML
INJECTION, POWDER, LYOPHILIZED, FOR SOLUTION INTRAVENOUS AS NEEDED
Status: DISCONTINUED | OUTPATIENT
Start: 2024-01-05 | End: 2024-01-05 | Stop reason: HOSPADM

## 2024-01-05 RX ORDER — ONDANSETRON 2 MG/ML
INJECTION INTRAMUSCULAR; INTRAVENOUS AS NEEDED
Status: DISCONTINUED | OUTPATIENT
Start: 2024-01-05 | End: 2024-01-05 | Stop reason: SURG

## 2024-01-05 RX ORDER — DEXAMETHASONE SODIUM PHOSPHATE 4 MG/ML
INJECTION, SOLUTION INTRA-ARTICULAR; INTRALESIONAL; INTRAMUSCULAR; INTRAVENOUS; SOFT TISSUE
Status: COMPLETED | OUTPATIENT
Start: 2024-01-05 | End: 2024-01-05

## 2024-01-05 RX ORDER — FLUMAZENIL 0.1 MG/ML
0.2 INJECTION INTRAVENOUS AS NEEDED
Status: DISCONTINUED | OUTPATIENT
Start: 2024-01-05 | End: 2024-01-05 | Stop reason: HOSPADM

## 2024-01-05 RX ORDER — HYDROMORPHONE HYDROCHLORIDE 1 MG/ML
0.25 INJECTION, SOLUTION INTRAMUSCULAR; INTRAVENOUS; SUBCUTANEOUS
Status: DISCONTINUED | OUTPATIENT
Start: 2024-01-05 | End: 2024-01-05 | Stop reason: HOSPADM

## 2024-01-05 RX ORDER — GABAPENTIN 300 MG/1
300 CAPSULE ORAL 2 TIMES DAILY
Qty: 30 CAPSULE | Refills: 0 | Status: SHIPPED | OUTPATIENT
Start: 2024-01-05

## 2024-01-05 RX ORDER — ONDANSETRON 2 MG/ML
4 INJECTION INTRAMUSCULAR; INTRAVENOUS ONCE AS NEEDED
Status: COMPLETED | OUTPATIENT
Start: 2024-01-05 | End: 2024-01-05

## 2024-01-05 RX ORDER — MAGNESIUM SULFATE HEPTAHYDRATE 500 MG/ML
INJECTION, SOLUTION INTRAMUSCULAR; INTRAVENOUS AS NEEDED
Status: DISCONTINUED | OUTPATIENT
Start: 2024-01-05 | End: 2024-01-05 | Stop reason: SURG

## 2024-01-05 RX ORDER — DIPHENHYDRAMINE HYDROCHLORIDE 50 MG/ML
12.5 INJECTION INTRAMUSCULAR; INTRAVENOUS
Status: DISCONTINUED | OUTPATIENT
Start: 2024-01-05 | End: 2024-01-05 | Stop reason: HOSPADM

## 2024-01-05 RX ADMIN — ROCURONIUM BROMIDE 50 MG: 10 INJECTION, SOLUTION INTRAVENOUS at 14:11

## 2024-01-05 RX ADMIN — HYDROMORPHONE HYDROCHLORIDE 0.25 MG: 1 INJECTION, SOLUTION INTRAMUSCULAR; INTRAVENOUS; SUBCUTANEOUS at 18:23

## 2024-01-05 RX ADMIN — DEXAMETHASONE SODIUM PHOSPHATE 4 MG: 4 INJECTION, SOLUTION INTRA-ARTICULAR; INTRALESIONAL; INTRAMUSCULAR; INTRAVENOUS; SOFT TISSUE at 14:16

## 2024-01-05 RX ADMIN — SODIUM CHLORIDE, POTASSIUM CHLORIDE, SODIUM LACTATE AND CALCIUM CHLORIDE 9 ML/HR: 600; 310; 30; 20 INJECTION, SOLUTION INTRAVENOUS at 13:10

## 2024-01-05 RX ADMIN — HYDROMORPHONE HYDROCHLORIDE 0.25 MG: 1 INJECTION, SOLUTION INTRAMUSCULAR; INTRAVENOUS; SUBCUTANEOUS at 18:05

## 2024-01-05 RX ADMIN — DEXAMETHASONE SODIUM PHOSPHATE 4 MG: 4 INJECTION, SOLUTION INTRA-ARTICULAR; INTRALESIONAL; INTRAMUSCULAR; INTRAVENOUS; SOFT TISSUE at 13:24

## 2024-01-05 RX ADMIN — TRANEXAMIC ACID 1000 MG: 100 INJECTION INTRAVENOUS at 14:32

## 2024-01-05 RX ADMIN — MIDAZOLAM 0.5 MG: 1 INJECTION INTRAMUSCULAR; INTRAVENOUS at 13:18

## 2024-01-05 RX ADMIN — PROPOFOL 120 MG: 10 INJECTION, EMULSION INTRAVENOUS at 14:11

## 2024-01-05 RX ADMIN — Medication 200 MCG: at 15:41

## 2024-01-05 RX ADMIN — ACETAMINOPHEN 1000 MG: 500 TABLET ORAL at 13:08

## 2024-01-05 RX ADMIN — MAGNESIUM SULFATE HEPTAHYDRATE 2 G: 500 INJECTION, SOLUTION INTRAMUSCULAR; INTRAVENOUS at 14:35

## 2024-01-05 RX ADMIN — ONDANSETRON HYDROCHLORIDE 4 MG: 2 INJECTION, SOLUTION INTRAMUSCULAR; INTRAVENOUS at 17:43

## 2024-01-05 RX ADMIN — ONDANSETRON 4 MG: 2 INJECTION INTRAMUSCULAR; INTRAVENOUS at 16:43

## 2024-01-05 RX ADMIN — DROPERIDOL 0.62 MG: 2.5 INJECTION, SOLUTION INTRAMUSCULAR; INTRAVENOUS at 14:16

## 2024-01-05 RX ADMIN — HYDRALAZINE HYDROCHLORIDE 5 MG: 20 INJECTION, SOLUTION INTRAMUSCULAR; INTRAVENOUS at 14:41

## 2024-01-05 RX ADMIN — LIDOCAINE HYDROCHLORIDE 60 MG: 20 INJECTION, SOLUTION INFILTRATION; PERINEURAL at 14:11

## 2024-01-05 RX ADMIN — CEFAZOLIN SODIUM 2000 MG: 2 INJECTION, SOLUTION INTRAVENOUS at 13:50

## 2024-01-05 RX ADMIN — SUGAMMADEX 200 MG: 100 INJECTION, SOLUTION INTRAVENOUS at 17:08

## 2024-01-05 RX ADMIN — PROPOFOL 25 MCG/KG/MIN: 10 INJECTION, EMULSION INTRAVENOUS at 14:15

## 2024-01-05 RX ADMIN — ROPIVACAINE HYDROCHLORIDE 30 ML: 5 INJECTION EPIDURAL; INFILTRATION; PERINEURAL at 13:24

## 2024-01-05 NOTE — OP NOTE
Orthopaedic & Sports Medicine Surgery  Dr. Praful Haque MD  [phone]    Operative Note    PATIENT NAME: Elena Siddqiui  MRN: [id number]  : 1945 AGE: 78 y.o. GENDER: female  DATE OF OPERATION: 2024  PREOPERATIVE DIAGNOSIS: Left humeral shaft fracture fracture  POSTOPERATIVE DIAGNOSIS: Same  OPERATION PERFORMED: ORIF left humeral shaft  SURGEON: Praful Haque MD  Circulator: Kate Reyes, TIBURCIO; Rosa Mckenna RN; Christina Dixon RN  Radiology Technologist: Travon Bazan, GERARDO  Scrub Person: Aleja Chavis; Jim Lawson; Shagufta Taylor  Assistant: Miles Cortez IV, CSFA  ANESTHESIA: General with Block  ASSISTANT: Miles Cortez CFA an assistant was necessary and used to help with reduction, retracting, positioning, closing, suturing, placing dressing/splint. The case could not be performed without their skilled assistance.  ESTIMATED BLOOD LOSS: 50 cc  ANTIBIOTICS: Ancef  SPONGE AND NEEDLE COUNT: Correct  INDICATIONS: Patient had a humeral shaft fracture.  She previously had a proximal humerus fracture that underwent ORIF approximately 7 years ago, and subsequently a nonoperatively treated fracture below that.  She had a recent fall and transverse humerus fracture below that.  We attempted conservative treatment in a Tabor brace, but she was not making acceptable healing and bone union so elected to undergo operative intervention.  The risks of surgery were discussed and included the risk of anesthesia, infection, damage to neurovascular structures especially high risk of radial and ulnar nerves, implant loosening/failure, hardware prominence, nonunion/malunion, arthritis, the need for further procedures, medical complications, stiffness, continued pain, damage to nerve and blood vessels, DVT, PE, MI, stroke, death, and others. No guarantees were made. The patient wished to proceed with surgery and a surgical consent was signed.  COMPONENTS:   [employer] 12 hole 3.5 mm strong  plate.      DETAILS OF PROCEDURE:  The patient was met in the preoperative area. The site was marked. The consent and H&P were reviewed. The patient was then wheeled back to the operative suite and underwent anesthesia. The patient was secured in the lateral decubitus position with all bony prominences well-padded. Excess hair about the planned operative incision was removed using surgical clippers. Surgical alcohol was used to thoroughly clean the entire operative extremity.    The operative arm was then prepped in the normal sterile fashion, which included ChloraPrep and multiple layers of sterile drapes. All bony prominences were well padded. The surgical incision was marked. A surgical timeout was performed in which administration of preoperative antibiotics and the surgical site were confirmed.     The arm was exsanguinated with an Esmarch and the tourniquet was inflated.    We started with a posterior approach to the humerus.  Incised the skin in the midline and came down and incised the fascia over the triceps.  We meticulously dissected out on the ulnar side and identified the ulnar nerve and protected.  We came to the radial side and identified the posterior radial cutaneous nerve and traced this to the radial nerve proper in the spiral groove.  Once we identified the nerve we did take off the sterile tourniquet so that we could further extend the incision high enough to get up to the radial nerve and to the proximal aspect of the fracture.  Were able to release the muscle around this and free of the radial nerve taking into the radial side.  We identified the fracture site and split through the deep triceps muscle to identify the bone.    We were able to avoid the proximal plate.  At the fracture site there was severe deformity due to the previous spiral fracture that had healed.  We were able to clean off the fibrous tissue and callus around the fracture site to get fresh edges.  We then reduced the  fracture and held in place while selecting appropriate size plate.  We had to contour this to the bone due to the anterior bow from the previous fracture.  Once we contoured the plate we placed a cortical screw on either side of the fracture site to hold this in place.  We then sequentially drilled and placed a compression screw on either side of the fracture in sequential fashion, to get a compression screw from either side of the fracture.  We secured these 2 screws in place and then exchanged the first 2 screws for locking screws.  We then drilled and placed 2 more locking screws on the proximal and distal aspect of the fracture for a total of 4 screws on both sides of the fracture.  We palpated the fracture and had good compression at the fracture site and the most anatomically aligned fracture was good for her unnatural anatomy due to the previous fracture union.  With great bite of all the screws and she had moderate bone quality.    We took final x-rays confirming reduction and good fixation of the plate and screws.      We then copiously irrigated out the wound.  Hemostasis was achieved.  Vancomycin powder was placed in the wound.  We then closed the posterior fascia with 0 Vicryl sutures taking utmost care in avoiding neurovascular structures.  Due to the curvature of her bone the radial nerve did not really crossover the plate but more went over and around the proximal segment so cannot say which screw hole the nerve crossed if needed in the future.  Then closed the wound in layers.  A sterile dressing was placed.  The drapes were taken down.  The patient was placed in a posterior slab splint .  She was moved from the table to the bed.    All counts were correct at the end of the procedure. I was present and scrubbed for all portions of the case. There were no apparent complications.     Post Operative Course:   Nonweightbearing operative extremity.   Sling for the first 2 weeks to let the incision heal,  and then will likely discontinue once follows up in the office.      Praful Haque MD  [facility]  Orthopaedic Surgery, Sports Medicine  [phone]

## 2024-01-05 NOTE — H&P
"    Orthopaedic & Sports Medicine Surgery  Dr. Praful Haque MD  [phone]       History & Physical       Patient: Elena Siddiqui    Proposed Surgery and date: Procedure(s) (LRB):  LEFT HUMERUS OPEN REDUCTION INTERNAL FIXATION WITH HARDWARE REMOVAL (Left)     YOB: 1945    Medical Record Number: [id number]  Wt Readings from Last 3 Encounters:   11/20/23 75.8 kg (167 lb)   01/17/23 75.8 kg (167 lb)   07/13/19 84.5 kg (186 lb 6 oz)     Ht Readings from Last 3 Encounters:   11/20/23 167.6 cm (66\")   01/17/23 170.2 cm (67\")   07/13/19 170.2 cm (67\")     There is no height or weight on file to calculate BMI.  No height and weight on file for this encounter.      Surgeon:  Dr. Praful Haque M.D.        Chief Complaints: left arm pain    History of Present Illness: 78 y.o. female presents with left humerus fracture that was treated for 6 weeks in a ramey brace. Unfortunately she has not united with minimal callus formation.     Allergies:   Allergies   Allergen Reactions    Levofloxacin Rash    Sulfa Antibiotics Rash       Medications:   Home Medications:  No current facility-administered medications on file prior to encounter.     Current Outpatient Medications on File Prior to Encounter   Medication Sig    aspirin 81 MG chewable tablet Chew 1 tablet Daily.    atorvastatin (LIPITOR) 10 MG tablet Take 1 tablet by mouth Daily.    HYDROcodone-acetaminophen (NORCO) 5-325 MG per tablet Take 1 tablet by mouth Every 4 (Four) Hours As Needed for Moderate Pain.    isosorbide dinitrate (ISORDIL) 20 MG tablet Take 1 tablet by mouth 3 (Three) Times a Day.    losartan (COZAAR) 50 MG tablet Take 0.5 tablets by mouth Daily.    metoprolol succinate XL (TOPROL-XL) 100 MG 24 hr tablet Take 1 tablet by mouth Daily.    PARoxetine (PAXIL) 20 MG tablet Take 1 tablet by mouth Every Morning.    ranolazine (RANEXA) 500 MG 12 hr tablet Take 1 tablet by mouth 2 (Two) Times a Day.     Current Medications:  Scheduled Meds:  Continuous " Infusions:No current facility-administered medications for this encounter.    PRN Meds:.    Past Medical History:   Diagnosis Date    Aneurysm     cerebral, monitored by Dr. Hurley... LAST CHECK 1/24/18...  RECENT CT ON FILE FROM FALL    Balance problems     Depression     Frequent falls     Frequent UTI     Hammer toe of left foot     Headaches, cluster     Hypercholesteremia     Hypertension     Hyponatremia     HISTORY OF...    Neck pain     PONV (postoperative nausea and vomiting)     Radius fracture         Past Surgical History:   Procedure Laterality Date    BUNIONECTOMY Left     HAMMER TOE REPAIR Left 2/9/2018    Procedure: LEFT 2ND HAMMER TOE CORRECTION  CORRECTION OF ANGULAR DEFORMITY;  Surgeon: Miles Brown Jr., MD;  [facility] [place] [facility] [place];  Service:     HIP ARTHROPLASTY Left     KNEE ARTHROPLASTY Bilateral     ORIF HUMERUS FRACTURE Left 12/30/2016    Procedure: LEFT PROXIMAL HUMERUS  OPEN REDUCTION  INTERNAL FIXATION ;  Surgeon: Alfonso Troy MD;  Location: [facility];  Service:     ORIF WRIST FRACTURE Right 7/13/2019    Procedure: ULNA/RADIUS OPEN REDUCTION INTERNAL FIXATION;  Surgeon: Raegan Mcgee MD;  Location: [facility];  Service: Orthopedics    OTHER SURGICAL HISTORY      Arm surgery (left)    TOE SURGERY Left     hammer toe        Social History     Occupational History    Occupation: retired   Tobacco Use    Smoking status: Never    Smokeless tobacco: Never   Vaping Use    Vaping Use: Never used   Substance and Sexual Activity    Alcohol use: Yes     Comment: Rarely, special occasions     Drug use: No    Sexual activity: Not on file      Social History     Social History Narrative    Not on file        Family History   Problem Relation Age of Onset    Diabetes Paternal Grandmother     Malig Hyperthermia Neg Hx          Review of Systems: 14 point review of systems was performed and was negative except as documented in the history of present illness.      Physical Exam: 78  y.o. female    Vital signs reviewed.    General Appearance:    Alert, cooperative, in no acute distress                  General: alert, oriented  Eyes: conjunctiva clear  ENT: external ears and nose atraumatic  CV: no peripheral edema  Resp: normal respiratory effort  Skin: no rashes or wounds; normal turgor  Psych: mood and affect appropriate  Lymph: no nodes appreciated  Neuro: gross sensation intact  Vascular:  Palpable peripheral pulse in noted extremity  Musculoskeletal Extremities:  tenderness over operative extremity. Moves all extremities well, no to minimal LE edema, no cyanosis, no redness            Diagnostic Tests:  Lab Results (last 7 days)       ** No results found for the last 168 hours. **            Radiology images/reports reviewed and as indicated previously in the note.       Assessment: left humeral shaft fracture    Plan:  left humerus open reduction and internal fixation, potential hardware removal. We will plan on proceeding with surgery at patient's request. Dr. Haque has reviewed details of procedure with patient today and discussed risks, benefits, alternatives, and limitations of the procedure in laymen's terms with the risks including but not limited to:  neurovascular damage, bleeding, infection, chronic pain, worsening of pain, chondrolysis, recurrent problem,  swelling, loss of motion, weakness, stiffness, instability, DVT, pulmonary embolus, death, stroke, complex regional pain syndrome, and need for additional procedures.  No guarantees were given regarding results of surgery.     Patient was given the opportunity to ask and have all questions answered today.  The patient voiced understanding of the risks, benefits, and alternative forms of treatment that were discussed and the patient consents to proceed with surgery.     Discharge Plan: Home with f/u in with Dr. Haque  Date: 1/5/2024    Praful Haque MD  [facility]  Orthopaedic Surgery, Sports Medicine  [phone]

## 2024-01-05 NOTE — ANESTHESIA PREPROCEDURE EVALUATION
Anesthesia Evaluation     Patient summary reviewed and Nursing notes reviewed   history of anesthetic complications:  PONV  NPO Solid Status: > 6 hours  NPO Liquid Status: > 2 hours           Airway   Mallampati: II  TM distance: >3 FB  Neck ROM: full  Dental    (+) partials    Pulmonary    (-) COPD, asthma, not a smoker  Cardiovascular   Exercise tolerance: good (4-7 METS)    ECG reviewed    (+) hypertension, CAD, hyperlipidemia  (-) past MI, dysrhythmias, angina    ROS comment: Stress test 2022 low risk. LVEF 75% on stress echo.     TTE 2018 LV/RV fxn nl, mild LVH, no significant valve issues.     Neuro/Psych  (+) headaches, psychiatric history Depression  (-) seizures, CVA  GI/Hepatic/Renal/Endo    (-) GERD, liver disease, no renal disease, diabetes, no thyroid disorder    Musculoskeletal     Abdominal    Substance History      OB/GYN          Other                      Anesthesia Plan    ASA 3     general with block       Anesthetic plan, risks, benefits, and alternatives have been provided, discussed and informed consent has been obtained with: patient.    CODE STATUS:

## 2024-01-05 NOTE — BRIEF OP NOTE
HUMERUS DISTAL OPEN REDUCTION INTERNAL FIXATION  Progress Note    Elena Siddiqui  1/5/2024    Pre-op Diagnosis:   Left humerus fracture       Post-Op Diagnosis Codes:  Same    Procedure/CPT® Codes:        Procedure(s):  LEFT HUMERUS OPEN REDUCTION INTERNAL FIXATION WITH HARDWARE REMOVAL              Surgeon(s):  Praful Haque MD    Anesthesia: General with Block    Staff:   Circulator: Kate Reyes, TIBURCIO; Rosa Mckenna RN; Christina Dixon RN  Radiology Technologist: Travon Bazan RRT  Scrub Person: Aleja Chavis; Jim Lawson; Shagufta Taylor  Assistant: Miles Cortez IV, CSFA  Assistant: Miles Cortez IV CSFA      Estimated Blood Loss:  50 cc    Urine Voided: * No values recorded between 1/5/2024  1:56 PM and 1/5/2024  5:15 PM *    Specimens:                None          Drains: * No LDAs found *    Findings: See dictated op report        Complications: None apparent at time of dictation    Assistant: Miles Cortez IV CSFRIP  was responsible for performing the following activities: Retraction, Suction, Irrigation, Suturing, Closing, and Placing Dressing and their skilled assistance was necessary for the success of this case.    Praful Haque MD     Date: 1/5/2024  Time: 17:21 EST

## 2024-01-05 NOTE — ANESTHESIA PROCEDURE NOTES
Airway  Urgency: elective    Date/Time: 1/5/2024 2:14 PM  Airway not difficult    General Information and Staff    Patient location during procedure: OR  CRNA/CAA: Greg Franks CRNA    Indications and Patient Condition  Indications for airway management: airway protection    Preoxygenated: yes  Mask difficulty assessment: 1 - vent by mask    Final Airway Details  Final airway type: endotracheal airway      Successful airway: ETT  Cuffed: yes   Successful intubation technique: direct laryngoscopy  Facilitating devices/methods: intubating stylet  Endotracheal tube insertion site: oral  Blade: Carrie  Blade size: 3  ETT size (mm): 7.0  Cormack-Lehane Classification: grade I - full view of glottis  Placement verified by: chest auscultation and capnometry   Measured from: teeth  ETT/EBT  to teeth (cm): 21  Number of attempts at approach: 1  Assessment: lips, teeth, and gum same as pre-op and atraumatic intubation

## 2024-01-05 NOTE — ANESTHESIA PROCEDURE NOTES
Peripheral Block      Patient reassessed immediately prior to procedure    Patient location during procedure: pre-op  Reason for block: at surgeon's request and post-op pain management  Performed by  Anesthesiologist: Yanick Price MD  Preanesthetic Checklist  Completed: patient identified, IV checked, site marked, risks and benefits discussed, surgical consent, monitors and equipment checked, pre-op evaluation and timeout performed  Prep:  Pt Position: supine  Sterile barriers:gloves, mask and cap  Prep: ChloraPrep  Patient monitoring: blood pressure monitoring, continuous pulse oximetry and EKG  Procedure    Sedation: yes    Guidance:ultrasound guided    ULTRASOUND INTERPRETATION.  Using ultrasound guidance a gauge needle was placed in close proximity to the brachial plexus nerve, at which point, under ultrasound guidance anesthetic was injected in the area of the nerve and spread of the anesthesia was seen on ultrasound in close proximity thereto.  There were no abnormalities seen on ultrasound; a digital image was taken; and the patient tolerated the procedure with no complications. Images:still images obtained, printed/placed on chart    Laterality:left  Block Type:supraclavicular  Injection Technique:single-shot  Needle Type:echogenic  Needle Gauge:21 G  Resistance on Injection: none    Medications Used: ropivacaine (NAROPIN) 0.5 % injection - Injection   30 mL - 1/5/2024 1:24:00 PM  dexamethasone (DECADRON) injection - Injection   4 mg - 1/5/2024 1:24:00 PM      Post Assessment  Injection Assessment: negative aspiration for heme, no paresthesia on injection and incremental injection  Patient Tolerance:comfortable throughout block  Complications:no

## 2024-01-06 NOTE — DISCHARGE INSTRUCTIONS
What to expect after a Nerve Block    Nerve blocks administered to block pain affect many types of nerves, including those nerves that control movement, pain, and normal sensation. Following a nerve block, you may notice some bruising at the site where the block was given. You may experience sensations such as: numbness of the affected area or limb, tingling, heaviness (that is the limb feels heavy to you), weakness or inability to move the affected arm or leg, or a feeling as if your arm or leg has “fallen asleep.”     A nerve block can last from 2 to 36 hours depending on the medications used.  Usually the weakness wears off first followed by the tingling and heaviness. As the block wears off, you may begin to notice pain; however, this sequence of events may occur in any order. Typically, you will be able to move your limb before you will feel it. Once a nerve block begins to wear off, the effects are usually completely gone within 60 minutes.  If you experience continued side effects that you believe are block related for longer than 48 hours, please call your healthcare provider. Please see block-specific instructions below.    Instructions for any Block involving the leg/foot:   If you have had a leg /foot block, you should not bear weight on the affected leg until the block has worn off. After the block has worn off, weight bearing should be as directed by your surgeon. You may be sent home with crutches. You are at high risk for falling because of the anesthetic effects on your leg. Please use caution when standing or trying to move or walk. Have someone assist you until your leg and foot function have returned to normal.     Protection of a “blocked” limb  After a nerve block, you cannot feel pain, pressure, or extremes of temperature in the affected limb. And because of this, your blocked limb is at more risk for injury. For example, it is possible to burn your limb on an extremely hot surface without  feeling it.     When resting, it is important to reposition your limb periodically to avoid prolonged pressure on it. This may require the use of pillows and padding.    While sleeping, you should avoid rolling onto the affected limb or putting too much pressure on it.     If you have a cast or tight dressing, check the color of your fingers or toes of the affected limb. Call your surgeon if they look discolored (that is, dusky, dark colored).    Use caution in cold weather. Cover your limb appropriately to protect it from the cold.    Pain Management:  Your surgeon will give you a prescription for pain medication. Begin taking this before the nerve block wears off. Bear in mind that sometimes the block can wear off in the middle of the night.

## 2024-01-06 NOTE — ANESTHESIA POSTPROCEDURE EVALUATION
Patient: Elena Siddiqui    Procedure Summary       Date: 01/05/24 Room / Location: [facility] OR [address] [place] [address] MAIN OR    Anesthesia Start: 1355 Anesthesia Stop: 1719    Procedure: LEFT HUMERUS OPEN REDUCTION INTERNAL FIXATION WITH HARDWARE REMOVAL (Left: Arm Upper) Diagnosis:     Surgeons: Praful Haque MD Provider: Kenyon Jasmine MD    Anesthesia Type: general with block ASA Status: 3            Anesthesia Type: general with block    Vitals  Vitals Value Taken Time   /62 01/05/24 1907   Temp 36.6 °C (97.9 °F) 01/05/24 1845   Pulse 80 01/05/24 1907   Resp 12 01/05/24 1900   SpO2 92 % 01/05/24 1907   Vitals shown include unfiled device data.        Post Anesthesia Care and Evaluation    Patient location during evaluation: bedside  Patient participation: complete - patient participated  Level of consciousness: awake  Pain management: adequate    Airway patency: patent  Anesthetic complications: No anesthetic complications    Cardiovascular status: acceptable  Respiratory status: acceptable  Hydration status: acceptable    Comments: /61   Pulse 84   Temp 36.6 °C (97.9 °F) (Oral)   Resp 16   Wt 75.9 kg (167 lb 5.3 oz)   SpO2 95%   BMI 27.01 kg/m²

## 2024-01-19 ENCOUNTER — APPOINTMENT (OUTPATIENT)
Dept: GENERAL RADIOLOGY | Facility: HOSPITAL | Age: 79
End: 2024-01-19
Payer: MEDICARE

## 2024-01-19 ENCOUNTER — HOSPITAL ENCOUNTER (OUTPATIENT)
Facility: HOSPITAL | Age: 79
Setting detail: HOSPITAL OUTPATIENT SURGERY
Discharge: HOME | End: 2024-01-19
Attending: STUDENT IN AN ORGANIZED HEALTH CARE EDUCATION/TRAINING PROGRAM | Admitting: STUDENT IN AN ORGANIZED HEALTH CARE EDUCATION/TRAINING PROGRAM
Payer: MEDICARE

## 2024-01-19 ENCOUNTER — ANESTHESIA (OUTPATIENT)
Dept: PERIOP | Facility: HOSPITAL | Age: 79
End: 2024-01-19
Payer: MEDICARE

## 2024-01-19 ENCOUNTER — ANESTHESIA EVENT (OUTPATIENT)
Dept: PERIOP | Facility: HOSPITAL | Age: 79
End: 2024-01-19
Payer: MEDICARE

## 2024-01-19 VITALS
TEMPERATURE: 97.6 F | DIASTOLIC BLOOD PRESSURE: 54 MMHG | SYSTOLIC BLOOD PRESSURE: 111 MMHG | HEART RATE: 73 BPM | RESPIRATION RATE: 18 BRPM | OXYGEN SATURATION: 97 %

## 2024-01-19 DIAGNOSIS — S42.322K CLOSED DISPLACED TRANSVERSE FRACTURE OF SHAFT OF LEFT HUMERUS WITH NONUNION, SUBSEQUENT ENCOUNTER: Primary | ICD-10-CM

## 2024-01-19 PROCEDURE — C1713 ANCHOR/SCREW BN/BN,TIS/BN: HCPCS | Performed by: STUDENT IN AN ORGANIZED HEALTH CARE EDUCATION/TRAINING PROGRAM

## 2024-01-19 PROCEDURE — 25010000002 ONDANSETRON PER 1 MG: Performed by: REGISTERED NURSE

## 2024-01-19 PROCEDURE — 25010000002 VANCOMYCIN 1 G RECONSTITUTED SOLUTION: Performed by: STUDENT IN AN ORGANIZED HEALTH CARE EDUCATION/TRAINING PROGRAM

## 2024-01-19 PROCEDURE — L3660 SO 8 AB RSTR CAN/WEB PRE OTS: HCPCS | Performed by: STUDENT IN AN ORGANIZED HEALTH CARE EDUCATION/TRAINING PROGRAM

## 2024-01-19 PROCEDURE — 25010000002 DEXAMETHASONE PER 1 MG: Performed by: STUDENT IN AN ORGANIZED HEALTH CARE EDUCATION/TRAINING PROGRAM

## 2024-01-19 PROCEDURE — 25010000002 ROPIVACAINE PER 1 MG: Performed by: STUDENT IN AN ORGANIZED HEALTH CARE EDUCATION/TRAINING PROGRAM

## 2024-01-19 PROCEDURE — 25010000002 MIDAZOLAM PER 1 MG: Performed by: STUDENT IN AN ORGANIZED HEALTH CARE EDUCATION/TRAINING PROGRAM

## 2024-01-19 PROCEDURE — 25010000002 PROPOFOL 10 MG/ML EMULSION: Performed by: NURSE ANESTHETIST, CERTIFIED REGISTERED

## 2024-01-19 PROCEDURE — 25010000002 FENTANYL CITRATE (PF) 50 MCG/ML SOLUTION: Performed by: STUDENT IN AN ORGANIZED HEALTH CARE EDUCATION/TRAINING PROGRAM

## 2024-01-19 PROCEDURE — 76000 FLUOROSCOPY <1 HR PHYS/QHP: CPT

## 2024-01-19 PROCEDURE — 73060 X-RAY EXAM OF HUMERUS: CPT

## 2024-01-19 PROCEDURE — 25010000002 CEFAZOLIN IN DEXTROSE 2-4 GM/100ML-% SOLUTION: Performed by: STUDENT IN AN ORGANIZED HEALTH CARE EDUCATION/TRAINING PROGRAM

## 2024-01-19 PROCEDURE — 25010000002 SUGAMMADEX 200 MG/2ML SOLUTION: Performed by: STUDENT IN AN ORGANIZED HEALTH CARE EDUCATION/TRAINING PROGRAM

## 2024-01-19 PROCEDURE — 25010000002 PHENYLEPHRINE 10 MG/ML SOLUTION: Performed by: STUDENT IN AN ORGANIZED HEALTH CARE EDUCATION/TRAINING PROGRAM

## 2024-01-19 PROCEDURE — 25810000003 LACTATED RINGERS PER 1000 ML: Performed by: STUDENT IN AN ORGANIZED HEALTH CARE EDUCATION/TRAINING PROGRAM

## 2024-01-19 PROCEDURE — 25010000002 FENTANYL CITRATE (PF) 50 MCG/ML SOLUTION: Performed by: NURSE ANESTHETIST, CERTIFIED REGISTERED

## 2024-01-19 RX ORDER — LIDOCAINE HYDROCHLORIDE 20 MG/ML
INJECTION, SOLUTION INFILTRATION; PERINEURAL AS NEEDED
Status: DISCONTINUED | OUTPATIENT
Start: 2024-01-19 | End: 2024-01-19 | Stop reason: SURG

## 2024-01-19 RX ORDER — VANCOMYCIN HYDROCHLORIDE 1 G/20ML
INJECTION, POWDER, LYOPHILIZED, FOR SOLUTION INTRAVENOUS AS NEEDED
Status: DISCONTINUED | OUTPATIENT
Start: 2024-01-19 | End: 2024-01-19 | Stop reason: HOSPADM

## 2024-01-19 RX ORDER — OXYCODONE HYDROCHLORIDE 5 MG/1
5 TABLET ORAL EVERY 4 HOURS PRN
Qty: 30 TABLET | Refills: 0 | Status: SHIPPED | OUTPATIENT
Start: 2024-01-19

## 2024-01-19 RX ORDER — OXYCODONE HYDROCHLORIDE AND ACETAMINOPHEN 5; 325 MG/1; MG/1
1 TABLET ORAL ONCE
Status: COMPLETED | OUTPATIENT
Start: 2024-01-19 | End: 2024-01-19

## 2024-01-19 RX ORDER — GABAPENTIN 300 MG/1
300 CAPSULE ORAL 2 TIMES DAILY
Qty: 30 CAPSULE | Refills: 0 | Status: SHIPPED | OUTPATIENT
Start: 2024-01-19

## 2024-01-19 RX ORDER — EPHEDRINE SULFATE 50 MG/ML
INJECTION, SOLUTION INTRAVENOUS AS NEEDED
Status: DISCONTINUED | OUTPATIENT
Start: 2024-01-19 | End: 2024-01-19 | Stop reason: SURG

## 2024-01-19 RX ORDER — CEFAZOLIN SODIUM 2 G/100ML
2000 INJECTION, SOLUTION INTRAVENOUS EVERY 8 HOURS SCHEDULED
Status: DISCONTINUED | OUTPATIENT
Start: 2024-01-19 | End: 2024-01-19

## 2024-01-19 RX ORDER — BUPIVACAINE HYDROCHLORIDE AND EPINEPHRINE 2.5; 5 MG/ML; UG/ML
INJECTION, SOLUTION INFILTRATION; PERINEURAL AS NEEDED
Status: DISCONTINUED | OUTPATIENT
Start: 2024-01-19 | End: 2024-01-19 | Stop reason: HOSPADM

## 2024-01-19 RX ORDER — HYDROCODONE BITARTRATE AND ACETAMINOPHEN 5; 325 MG/1; MG/1
1 TABLET ORAL ONCE AS NEEDED
Status: DISCONTINUED | OUTPATIENT
Start: 2024-01-19 | End: 2024-01-19 | Stop reason: HOSPADM

## 2024-01-19 RX ORDER — DROPERIDOL 2.5 MG/ML
0.62 INJECTION, SOLUTION INTRAMUSCULAR; INTRAVENOUS
Status: DISCONTINUED | OUTPATIENT
Start: 2024-01-19 | End: 2024-01-19 | Stop reason: HOSPADM

## 2024-01-19 RX ORDER — LABETALOL HYDROCHLORIDE 5 MG/ML
5 INJECTION, SOLUTION INTRAVENOUS
Status: DISCONTINUED | OUTPATIENT
Start: 2024-01-19 | End: 2024-01-19 | Stop reason: HOSPADM

## 2024-01-19 RX ORDER — IPRATROPIUM BROMIDE AND ALBUTEROL SULFATE 2.5; .5 MG/3ML; MG/3ML
3 SOLUTION RESPIRATORY (INHALATION) ONCE AS NEEDED
Status: DISCONTINUED | OUTPATIENT
Start: 2024-01-19 | End: 2024-01-19 | Stop reason: HOSPADM

## 2024-01-19 RX ORDER — PROPOFOL 10 MG/ML
VIAL (ML) INTRAVENOUS AS NEEDED
Status: DISCONTINUED | OUTPATIENT
Start: 2024-01-19 | End: 2024-01-19 | Stop reason: SURG

## 2024-01-19 RX ORDER — HYDRALAZINE HYDROCHLORIDE 20 MG/ML
5 INJECTION INTRAMUSCULAR; INTRAVENOUS
Status: DISCONTINUED | OUTPATIENT
Start: 2024-01-19 | End: 2024-01-19 | Stop reason: HOSPADM

## 2024-01-19 RX ORDER — MIDAZOLAM HYDROCHLORIDE 1 MG/ML
0.5 INJECTION INTRAMUSCULAR; INTRAVENOUS
Status: DISCONTINUED | OUTPATIENT
Start: 2024-01-19 | End: 2024-01-19 | Stop reason: HOSPADM

## 2024-01-19 RX ORDER — ONDANSETRON 2 MG/ML
INJECTION INTRAMUSCULAR; INTRAVENOUS AS NEEDED
Status: DISCONTINUED | OUTPATIENT
Start: 2024-01-19 | End: 2024-01-19 | Stop reason: SURG

## 2024-01-19 RX ORDER — FENTANYL CITRATE 50 UG/ML
25 INJECTION, SOLUTION INTRAMUSCULAR; INTRAVENOUS
Status: DISCONTINUED | OUTPATIENT
Start: 2024-01-19 | End: 2024-01-19 | Stop reason: HOSPADM

## 2024-01-19 RX ORDER — DEXAMETHASONE SODIUM PHOSPHATE 4 MG/ML
INJECTION, SOLUTION INTRA-ARTICULAR; INTRALESIONAL; INTRAMUSCULAR; INTRAVENOUS; SOFT TISSUE AS NEEDED
Status: DISCONTINUED | OUTPATIENT
Start: 2024-01-19 | End: 2024-01-19 | Stop reason: SURG

## 2024-01-19 RX ORDER — PROMETHAZINE HYDROCHLORIDE 25 MG/1
25 TABLET ORAL ONCE AS NEEDED
Status: DISCONTINUED | OUTPATIENT
Start: 2024-01-19 | End: 2024-01-19 | Stop reason: HOSPADM

## 2024-01-19 RX ORDER — TRANEXAMIC ACID 100 MG/ML
1000 INJECTION, SOLUTION INTRAVENOUS ONCE
Status: DISCONTINUED | OUTPATIENT
Start: 2024-01-19 | End: 2024-01-19 | Stop reason: HOSPADM

## 2024-01-19 RX ORDER — HYDROCODONE BITARTRATE AND ACETAMINOPHEN 7.5; 325 MG/1; MG/1
1 TABLET ORAL EVERY 4 HOURS PRN
Status: DISCONTINUED | OUTPATIENT
Start: 2024-01-19 | End: 2024-01-19 | Stop reason: HOSPADM

## 2024-01-19 RX ORDER — PROMETHAZINE HYDROCHLORIDE 25 MG/1
25 SUPPOSITORY RECTAL ONCE AS NEEDED
Status: DISCONTINUED | OUTPATIENT
Start: 2024-01-19 | End: 2024-01-19 | Stop reason: HOSPADM

## 2024-01-19 RX ORDER — ACETAMINOPHEN 500 MG
1000 TABLET ORAL ONCE
Status: COMPLETED | OUTPATIENT
Start: 2024-01-19 | End: 2024-01-19

## 2024-01-19 RX ORDER — FLUMAZENIL 0.1 MG/ML
0.2 INJECTION INTRAVENOUS AS NEEDED
Status: DISCONTINUED | OUTPATIENT
Start: 2024-01-19 | End: 2024-01-19 | Stop reason: HOSPADM

## 2024-01-19 RX ORDER — MAGNESIUM HYDROXIDE 1200 MG/15ML
LIQUID ORAL AS NEEDED
Status: DISCONTINUED | OUTPATIENT
Start: 2024-01-19 | End: 2024-01-19 | Stop reason: HOSPADM

## 2024-01-19 RX ORDER — PHENYLEPHRINE HYDROCHLORIDE 10 MG/ML
INJECTION INTRAVENOUS AS NEEDED
Status: DISCONTINUED | OUTPATIENT
Start: 2024-01-19 | End: 2024-01-19 | Stop reason: SURG

## 2024-01-19 RX ORDER — SODIUM CHLORIDE 0.9 % (FLUSH) 0.9 %
3-10 SYRINGE (ML) INJECTION AS NEEDED
Status: DISCONTINUED | OUTPATIENT
Start: 2024-01-19 | End: 2024-01-19 | Stop reason: HOSPADM

## 2024-01-19 RX ORDER — FENTANYL CITRATE 50 UG/ML
INJECTION, SOLUTION INTRAMUSCULAR; INTRAVENOUS AS NEEDED
Status: DISCONTINUED | OUTPATIENT
Start: 2024-01-19 | End: 2024-01-19 | Stop reason: SURG

## 2024-01-19 RX ORDER — CEFAZOLIN SODIUM 2 G/100ML
2000 INJECTION, SOLUTION INTRAVENOUS ONCE
Status: COMPLETED | OUTPATIENT
Start: 2024-01-19 | End: 2024-01-19

## 2024-01-19 RX ORDER — DEXAMETHASONE SODIUM PHOSPHATE 4 MG/ML
INJECTION, SOLUTION INTRA-ARTICULAR; INTRALESIONAL; INTRAMUSCULAR; INTRAVENOUS; SOFT TISSUE
Status: COMPLETED | OUTPATIENT
Start: 2024-01-19 | End: 2024-01-19

## 2024-01-19 RX ORDER — DIPHENHYDRAMINE HYDROCHLORIDE 50 MG/ML
12.5 INJECTION INTRAMUSCULAR; INTRAVENOUS
Status: DISCONTINUED | OUTPATIENT
Start: 2024-01-19 | End: 2024-01-19 | Stop reason: HOSPADM

## 2024-01-19 RX ORDER — ROCURONIUM BROMIDE 10 MG/ML
INJECTION, SOLUTION INTRAVENOUS AS NEEDED
Status: DISCONTINUED | OUTPATIENT
Start: 2024-01-19 | End: 2024-01-19 | Stop reason: SURG

## 2024-01-19 RX ORDER — ROPIVACAINE HYDROCHLORIDE 5 MG/ML
INJECTION, SOLUTION EPIDURAL; INFILTRATION; PERINEURAL
Status: COMPLETED | OUTPATIENT
Start: 2024-01-19 | End: 2024-01-19

## 2024-01-19 RX ORDER — NALOXONE HYDROCHLORIDE 4 MG/.1ML
SPRAY NASAL
Qty: 2 EACH | Refills: 0 | Status: SHIPPED | OUTPATIENT
Start: 2024-01-19

## 2024-01-19 RX ORDER — LIDOCAINE HYDROCHLORIDE 10 MG/ML
0.5 INJECTION, SOLUTION INFILTRATION; PERINEURAL ONCE AS NEEDED
Status: DISCONTINUED | OUTPATIENT
Start: 2024-01-19 | End: 2024-01-19 | Stop reason: HOSPADM

## 2024-01-19 RX ORDER — EPHEDRINE SULFATE 50 MG/ML
5 INJECTION, SOLUTION INTRAVENOUS ONCE AS NEEDED
Status: DISCONTINUED | OUTPATIENT
Start: 2024-01-19 | End: 2024-01-19 | Stop reason: HOSPADM

## 2024-01-19 RX ORDER — HYDROMORPHONE HYDROCHLORIDE 1 MG/ML
0.25 INJECTION, SOLUTION INTRAMUSCULAR; INTRAVENOUS; SUBCUTANEOUS
Status: DISCONTINUED | OUTPATIENT
Start: 2024-01-19 | End: 2024-01-19 | Stop reason: HOSPADM

## 2024-01-19 RX ORDER — NALOXONE HCL 0.4 MG/ML
0.2 VIAL (ML) INJECTION AS NEEDED
Status: DISCONTINUED | OUTPATIENT
Start: 2024-01-19 | End: 2024-01-19 | Stop reason: HOSPADM

## 2024-01-19 RX ORDER — TRANEXAMIC ACID 100 MG/ML
INJECTION, SOLUTION INTRAVENOUS AS NEEDED
Status: DISCONTINUED | OUTPATIENT
Start: 2024-01-19 | End: 2024-01-19 | Stop reason: SURG

## 2024-01-19 RX ORDER — FENTANYL CITRATE 50 UG/ML
25 INJECTION, SOLUTION INTRAMUSCULAR; INTRAVENOUS ONCE AS NEEDED
Status: COMPLETED | OUTPATIENT
Start: 2024-01-19 | End: 2024-01-19

## 2024-01-19 RX ORDER — ONDANSETRON 2 MG/ML
4 INJECTION INTRAMUSCULAR; INTRAVENOUS ONCE AS NEEDED
Status: DISCONTINUED | OUTPATIENT
Start: 2024-01-19 | End: 2024-01-19 | Stop reason: HOSPADM

## 2024-01-19 RX ORDER — SODIUM CHLORIDE 0.9 % (FLUSH) 0.9 %
3 SYRINGE (ML) INJECTION EVERY 12 HOURS SCHEDULED
Status: DISCONTINUED | OUTPATIENT
Start: 2024-01-19 | End: 2024-01-19 | Stop reason: HOSPADM

## 2024-01-19 RX ORDER — SODIUM CHLORIDE, SODIUM LACTATE, POTASSIUM CHLORIDE, CALCIUM CHLORIDE 600; 310; 30; 20 MG/100ML; MG/100ML; MG/100ML; MG/100ML
9 INJECTION, SOLUTION INTRAVENOUS CONTINUOUS
Status: DISCONTINUED | OUTPATIENT
Start: 2024-01-19 | End: 2024-01-19 | Stop reason: HOSPADM

## 2024-01-19 RX ADMIN — ROPIVACAINE HYDROCHLORIDE 29 ML: 5 INJECTION EPIDURAL; INFILTRATION; PERINEURAL at 11:25

## 2024-01-19 RX ADMIN — PROPOFOL 50 MCG/KG/MIN: 10 INJECTION, EMULSION INTRAVENOUS at 12:10

## 2024-01-19 RX ADMIN — OXYCODONE HYDROCHLORIDE AND ACETAMINOPHEN 1 TABLET: 5; 325 TABLET ORAL at 16:38

## 2024-01-19 RX ADMIN — EPHEDRINE SULFATE 10 MG: 50 INJECTION INTRAVENOUS at 13:14

## 2024-01-19 RX ADMIN — EPHEDRINE SULFATE 10 MG: 50 INJECTION INTRAVENOUS at 14:07

## 2024-01-19 RX ADMIN — PROPOFOL 20 MG: 10 INJECTION, EMULSION INTRAVENOUS at 12:55

## 2024-01-19 RX ADMIN — LIDOCAINE HYDROCHLORIDE 80 MG: 20 INJECTION, SOLUTION INFILTRATION; PERINEURAL at 12:10

## 2024-01-19 RX ADMIN — EPHEDRINE SULFATE 10 MG: 50 INJECTION INTRAVENOUS at 12:44

## 2024-01-19 RX ADMIN — PROPOFOL 70 MG: 10 INJECTION, EMULSION INTRAVENOUS at 12:10

## 2024-01-19 RX ADMIN — EPHEDRINE SULFATE 5 MG: 50 INJECTION INTRAVENOUS at 14:17

## 2024-01-19 RX ADMIN — ONDANSETRON 4 MG: 2 INJECTION INTRAMUSCULAR; INTRAVENOUS at 15:21

## 2024-01-19 RX ADMIN — MIDAZOLAM 0.5 MG: 1 INJECTION INTRAMUSCULAR; INTRAVENOUS at 11:23

## 2024-01-19 RX ADMIN — EPHEDRINE SULFATE 5 MG: 50 INJECTION INTRAVENOUS at 13:40

## 2024-01-19 RX ADMIN — EPHEDRINE SULFATE 10 MG: 50 INJECTION INTRAVENOUS at 13:58

## 2024-01-19 RX ADMIN — EPHEDRINE SULFATE 5 MG: 50 INJECTION INTRAVENOUS at 13:37

## 2024-01-19 RX ADMIN — DEXAMETHASONE SODIUM PHOSPHATE 4 MG: 4 INJECTION, SOLUTION INTRA-ARTICULAR; INTRALESIONAL; INTRAMUSCULAR; INTRAVENOUS; SOFT TISSUE at 11:25

## 2024-01-19 RX ADMIN — SUGAMMADEX 200 MG: 100 INJECTION, SOLUTION INTRAVENOUS at 15:30

## 2024-01-19 RX ADMIN — FENTANYL CITRATE 25 MCG: 50 INJECTION, SOLUTION INTRAMUSCULAR; INTRAVENOUS at 11:23

## 2024-01-19 RX ADMIN — TRANEXAMIC ACID 1000 MG: 100 INJECTION INTRAVENOUS at 14:40

## 2024-01-19 RX ADMIN — SODIUM CHLORIDE, POTASSIUM CHLORIDE, SODIUM LACTATE AND CALCIUM CHLORIDE 9 ML/HR: 600; 310; 30; 20 INJECTION, SOLUTION INTRAVENOUS at 11:16

## 2024-01-19 RX ADMIN — ROCURONIUM BROMIDE 40 MG: 10 INJECTION, SOLUTION INTRAVENOUS at 12:10

## 2024-01-19 RX ADMIN — CEFAZOLIN SODIUM 2000 MG: 2 INJECTION, SOLUTION INTRAVENOUS at 11:59

## 2024-01-19 RX ADMIN — FENTANYL CITRATE 25 MCG: 50 INJECTION, SOLUTION INTRAMUSCULAR; INTRAVENOUS at 13:29

## 2024-01-19 RX ADMIN — ACETAMINOPHEN 1000 MG: 500 TABLET ORAL at 11:16

## 2024-01-19 RX ADMIN — DEXAMETHASONE SODIUM PHOSPHATE 8 MG: 4 INJECTION, SOLUTION INTRA-ARTICULAR; INTRALESIONAL; INTRAMUSCULAR; INTRAVENOUS; SOFT TISSUE at 12:11

## 2024-01-19 RX ADMIN — TRANEXAMIC ACID 1000 MG: 100 INJECTION INTRAVENOUS at 12:30

## 2024-01-19 RX ADMIN — FENTANYL CITRATE 25 MCG: 50 INJECTION, SOLUTION INTRAMUSCULAR; INTRAVENOUS at 13:00

## 2024-01-19 RX ADMIN — EPHEDRINE SULFATE 10 MG: 50 INJECTION INTRAVENOUS at 13:19

## 2024-01-19 RX ADMIN — PHENYLEPHRINE HYDROCHLORIDE 100 MCG: 10 INJECTION INTRAVENOUS at 15:26

## 2024-01-19 RX ADMIN — EPHEDRINE SULFATE 5 MG: 50 INJECTION INTRAVENOUS at 14:12

## 2024-01-19 NOTE — ANESTHESIA PROCEDURE NOTES
Peripheral Block    Pre-sedation assessment completed: 1/19/2024 11:24 AM    Patient reassessed immediately prior to procedure    Patient location during procedure: pre-op  Start time: 1/19/2024 11:25 AM  Stop time: 1/19/2024 11:28 AM  Reason for block: at surgeon's request and post-op pain management  Performed by  Anesthesiologist: Yazan Casas MD  Preanesthetic Checklist  Completed: patient identified, IV checked, site marked, risks and benefits discussed, surgical consent, monitors and equipment checked, pre-op evaluation and timeout performed  Prep:  Pt Position: supine  Sterile barriers:cap, mask and gloves  Prep: ChloraPrep  Patient monitoring: blood pressure monitoring, continuous pulse oximetry and EKG  Procedure    Sedation: yes  Performed under: local infiltration  Guidance:ultrasound guided    ULTRASOUND INTERPRETATION.  Using ultrasound guidance a 21 G gauge needle was placed in close proximity to the nerve, at which point, under ultrasound guidance anesthetic was injected in the area of the nerve and spread of the anesthesia was seen on ultrasound in close proximity thereto.  There were no abnormalities seen on ultrasound; a digital image was taken; and the patient tolerated the procedure with no complications. Images:still images obtained, printed/placed on chart    Laterality:left  Block Type:supraclavicular  Injection Technique:single-shot  Needle Type:echogenic and Tuohy  Needle Gauge:21 G  Resistance on Injection: none    Medications Used: dexamethasone (DECADRON) injection - Injection   4 mg - 1/19/2024 11:25:00 AM  ropivacaine (NAROPIN) 0.5 % injection - Injection   29 mL - 1/19/2024 11:25:00 AM      Post Assessment  Injection Assessment: negative aspiration for heme, no paresthesia on injection and incremental injection  Patient Tolerance:comfortable throughout block  Complications:no  Additional Notes  Ultrasound guidance used to visualize nerve anatomy, guide needle placement and  verify local anesthetic disbursement.

## 2024-01-19 NOTE — ANESTHESIA PREPROCEDURE EVALUATION
Anesthesia Evaluation     Patient summary reviewed and Nursing notes reviewed   history of anesthetic complications:  PONV  NPO Solid Status: > 8 hours  NPO Liquid Status: > 2 hours           Airway   Mallampati: II  TM distance: >3 FB  Neck ROM: full  Dental    (+) partials    Pulmonary    Cardiovascular     ECG reviewed    (+) hypertension, CAD, hyperlipidemia      Neuro/Psych  GI/Hepatic/Renal/Endo      Musculoskeletal     Abdominal    Substance History      OB/GYN          Other                      Anesthesia Plan    ASA 3     general     intravenous induction     Anesthetic plan, risks, benefits, and alternatives have been provided, discussed and informed consent has been obtained with: patient.      CODE STATUS:

## 2024-01-19 NOTE — ANESTHESIA PROCEDURE NOTES
Airway  Urgency: elective    Date/Time: 1/19/2024 12:13 PM  Airway not difficult    General Information and Staff    Patient location during procedure: OR  Anesthesiologist: Gagan Nash MD  CRNA/CAA: Neli Pretty CRNA    Indications and Patient Condition  Indications for airway management: airway protection    Preoxygenated: yes  MILS not maintained throughout  Mask difficulty assessment: 2 - vent by mask + OA or adjuvant +/- NMBA    Final Airway Details  Final airway type: endotracheal airway      Successful airway: ETT  Cuffed: yes   Successful intubation technique: direct laryngoscopy  Facilitating devices/methods: intubating stylet  Endotracheal tube insertion site: oral  Blade: Carrie  Blade size: 3  ETT size (mm): 7.0  Cormack-Lehane Classification: grade I - full view of glottis  Placement verified by: chest auscultation and capnometry   Measured from: lips  ETT/EBT  to lips (cm): 21  Number of attempts at approach: 1  Assessment: lips, teeth, and gum same as pre-op and atraumatic intubation

## 2024-01-19 NOTE — OP NOTE
Orthopaedic & Sports Medicine Surgery  Dr. Praful Haque MD  [phone]    Operative Note    PATIENT NAME: Elena Siddiqui  MRN: [id number]  : 1945 AGE: 78 y.o. GENDER: female  DATE OF OPERATION: 2024  PREOPERATIVE DIAGNOSIS: left humerus fracture failed ORIF  POSTOPERATIVE DIAGNOSIS:   OPERATION PERFORMED: left humerus ORIF revision, hardware removal, bone graft  SURGEON: Praful Haque MD  Circulator: Fabiano Cantor RN  Scrub Person: Ana Luisa Carney  Vendor Representative: Vi Ball  Assistant: Miles Cortez IV, CSFA  ANESTHESIA: General  ASSISTANT: Miles Cortez CFA .  The assistant was necessary for retraction, positioning, reduction, irrigation, suture, closing, placing dressing.  The case would have been extremely difficult without their skilled assistance  IMPLANTS:   [employer] 20 hole extra-articular 3.5/2.7 left distal humerus plate  Arthrex fiber tape cerclage system x 2  Vivigen formable bone graft, size medium  ESTIMATED BLOOD LOSS: 200 cc  ANTIBIOTICS: Ancef  INDICATIONS: The patient had a left humeral shaft fracture and underwent ORIF 2 weeks ago.  At her postop visit the screws had started pulling out of the bone and plate on the distal segment and had failure of the construct.  We elected to revise this..  The risks of surgery were discussed and included the risk of anesthesia, damage to neurovascular structures, implant loosening/failure, iatrogenic fracture, the need for further procedures, nonunion/malunion, medical complications, stiffness, continued pain, damage to nerve and blood vessels especially the radial nerve, DVT, PE, MI, stroke, death, and others. No guarantees were made. The patient wished to proceed with surgery and a surgical consent was signed.      DETAILS OF PROCEDURE:  The patient was met in the preoperative area. The site was marked. The consent and H&P were reviewed. The patient was then wheeled back to the operative suite and underwent anesthesia.      The patient then moved to the table and placed in the lateral decubitus position with all bony prominences well-padded.  The patient was then prepped and draped in the standard sterile fashion.  Preop antibiotics were given within 1 hour prior to incision.  A timeout was performed according to facility protocol and everyone was agreement with the plan.    We then started by making a skin incision through the previous incision and extending some proximally and distally.  We developed our plan to remove the old sutures.  We identified the radial nerve and protected this.  We did place a vessel loop around this to tag.  We identified the old plate and remove the plate and screws fully.  We removed any callus from the bone with an elevator.  Then copiously irrigated out the wound of all debris.  We did dissect further proximally and identified some screws from the previous plate.  We cannot see the plate from this approach.  The entire length the case we did make sure to protect the radial nerve.    We then reduced the fracture and shows a 20 hole lateral extra-articular distal humerus plate to try to get a long construct with multiple screws.  We pinned this in place and took x-rays and like the placement.  We did bend the plate on the back table to fit her contour from the previous nonoperatively treated fracture that healed with a curved to the bone.  We reduced the bone in held the plate in place and drilled a cortical screw on the proximal and distal aspect of the fracture.  We then drilled a cortical screw in compression fashion in the distal segment and compressed through the fracture.  Because the multiple injuries to this we do not have complete cortical apposition but had approximately 50% or so cortical apposition.  It was also difficult placing the plate because of the prominent screws through the superior cortex.  We were able to position the plate so one of the old screws was going through the hole of  the new plate so that it would sit appropriately.  We then drilled, measured, and placed locking screws throughout the plate.  Because of her poor bone quality and failure of the previous construct we placed locking screws through all available screw holes.  Some the superior holes could not accommodate screws due to the proximal plate.  We did get some overlapping of the plates to not cause a stress riser.  The rest of the screw holes were filled up except for the most distal 1.  The distal 2 7 screws were unicortical.  We took the elbow through range of motion to ensure that the olecranon did not impinge on the plate.      We then placed Arthrex fiber tape cerclage system around the proximal and distal fracture fragments and tension these both down.  We used this as backup fixation to help relieve some of the stress on the screws and plate.  We had 1 fiber tape on the superior fragment and 1 on the distal fragment.  We then copiously irrigated out the wound with normal saline.      We then placed Vivigen formable bone graft and packed this in the fracture site.  We placed 1 g of vancomycin powder in the wound.  We then closed with deep fascia we could again making sure the radial nerve was free.  We then closed the skin in layers staples on the skin.  Placed her in a sterile well-padded dressing and then a well-padded posterior slab splint.  She was woken from anesthesia.  There were no apparent complications.  All counts were correct and the procedure.  Patient was taken back to recovery room in stable condition.    Post Operative Course:   Nonweightbearing and posterior slab splint for 2 weeks.  We will see her back in the office and get x-rays and likely remove staples.  At that point we will likely let her come out of the splint and into a sling and okay for elbow range of motion at the time.      Praful Haque MD  [facility]  Orthopaedic Surgery, Sports Medicine  [phone]

## 2024-01-19 NOTE — H&P
"    Orthopaedic & Sports Medicine Surgery  Dr. Praful Haque MD  [phone]       History & Physical       Patient: Elena Siddiqui    Proposed Surgery and date: Procedure(s) (LRB):  LEFT HUMERUS OPEN REDUCTION INTERNAL FIXATION REVISION (Left)     YOB: 1945    Medical Record Number: [id number]  Wt Readings from Last 3 Encounters:   01/05/24 75.9 kg (167 lb 5.3 oz)   11/20/23 75.8 kg (167 lb)   01/17/23 75.8 kg (167 lb)     Ht Readings from Last 3 Encounters:   11/20/23 167.6 cm (66\")   01/17/23 170.2 cm (67\")   07/13/19 170.2 cm (67\")     There is no height or weight on file to calculate BMI.  No height and weight on file for this encounter.      Surgeon:  Dr. Praful Haque M.D.        Chief Complaints: Left arm pain    History of Present Illness: 78 y.o. female presents with left humeral shaft fracture with plate failure.     Allergies:   Allergies   Allergen Reactions    Levofloxacin Rash    Sulfa Antibiotics Rash       Medications:   Home Medications:  No current facility-administered medications on file prior to encounter.     Current Outpatient Medications on File Prior to Encounter   Medication Sig    acetaminophen (TYLENOL) 500 MG tablet Take 2 tablets by mouth Every 8 (Eight) Hours for 60 days.    aspirin 81 MG chewable tablet Chew 1 tablet Daily.    atorvastatin (LIPITOR) 10 MG tablet Take 1 tablet by mouth Daily.    HYDROcodone-acetaminophen (NORCO) 5-325 MG per tablet Take 1 tablet by mouth Every 4 (Four) Hours As Needed for Moderate Pain.    isosorbide dinitrate (ISORDIL) 20 MG tablet Take 1 tablet by mouth 3 (Three) Times a Day.    losartan (COZAAR) 50 MG tablet Take 0.5 tablets by mouth Daily. (Patient taking differently: Take 0.5 tablets by mouth Every Night.)    metoprolol succinate XL (TOPROL-XL) 100 MG 24 hr tablet Take 1 tablet by mouth Every Night.    ondansetron (ZOFRAN) 8 MG tablet Take 1 tablet by mouth Every 8 (Eight) Hours As Needed for Nausea or Vomiting.    PARoxetine (PAXIL) " 20 MG tablet Take 1 tablet by mouth Every Morning.    ranolazine (RANEXA) 500 MG 12 hr tablet Take 1 tablet by mouth 2 (Two) Times a Day.    [DISCONTINUED] gabapentin (NEURONTIN) 300 MG capsule Take 1 capsule by mouth 2 (Two) Times a Day.    [DISCONTINUED] oxyCODONE (ROXICODONE) 5 MG immediate release tablet Take 1-2 tablets by mouth Every 4 (Four) Hours As Needed for Moderate Pain     Current Medications:  Scheduled Meds:ceFAZolin in dextrose, 2,000 mg, Intravenous, Q8H  sodium chloride, 3 mL, Intravenous, Q12H      Continuous Infusions:lactated ringers, 9 mL/hr, Last Rate: 9 mL/hr (01/19/24 1116)      PRN Meds:.  fentanyl    lidocaine    midazolam    sodium chloride    Past Medical History:   Diagnosis Date    Aneurysm     cerebral, monitored by Dr. Hurley... LAST CHECK 1/24/18...  RECENT CT ON FILE FROM FALL    Balance problems     Coronary artery disease     Depression     Frequent falls     Frequent UTI     Hammer toe of left foot     Headaches, cluster     Humerus shaft fracture 11/20/2023    Hypercholesteremia     Hypertension     Hyponatremia     HISTORY OF...    Neck pain     PONV (postoperative nausea and vomiting)     Radius fracture         Past Surgical History:   Procedure Laterality Date    BUNIONECTOMY Left     HAMMER TOE REPAIR Left 2/9/2018    Procedure: LEFT 2ND HAMMER TOE CORRECTION  CORRECTION OF ANGULAR DEFORMITY;  Surgeon: Miles Brown Jr., MD;  [place] [facility] [place] [facility] [place];  Service:     HIP ARTHROPLASTY Left     KNEE ARTHROPLASTY Bilateral     ORIF HUMERUS FRACTURE Left 12/30/2016    Procedure: LEFT PROXIMAL HUMERUS  OPEN REDUCTION  INTERNAL FIXATION ;  Surgeon: Alfonso Troy MD;  [place] [facility] [place] [facility] [place];  Service:     ORIF HUMERUS FRACTURE Left 1/5/2024    Procedure: LEFT HUMERUS OPEN REDUCTION INTERNAL FIXATION WITH HARDWARE REMOVAL;  Surgeon: Praful Haque MD;  Location: [facility] OR;  Service: Orthopedics;  Laterality: Left;    ORIF WRIST FRACTURE Right 7/13/2019    Procedure: ULNA/RADIUS  OPEN REDUCTION INTERNAL FIXATION;  Surgeon: Raegan Mcgee MD;  Location: [facility] OR;  Service: Orthopedics    OTHER SURGICAL HISTORY      Arm surgery (left)    TOE SURGERY Left     hammer toe        Social History     Occupational History    Occupation: retired   Tobacco Use    Smoking status: Never    Smokeless tobacco: Never   Vaping Use    Vaping Use: Never used   Substance and Sexual Activity    Alcohol use: Yes     Comment: Rarely, special occasions     Drug use: No    Sexual activity: Defer      Social History     Social History Narrative    Not on file        Family History   Problem Relation Age of Onset    Diabetes Paternal Grandmother     Malig Hyperthermia Neg Hx          Review of Systems: 14 point review of systems was performed and was negative except as documented in the history of present illness.      Physical Exam: 78 y.o. female    Vital signs reviewed.    General Appearance:    Alert, cooperative, in no acute distress                  General: alert, oriented  Eyes: conjunctiva clear  ENT: external ears and nose atraumatic  CV: no peripheral edema  Resp: normal respiratory effort  Skin: no rashes or wounds; normal turgor  Psych: mood and affect appropriate  Lymph: no nodes appreciated  Neuro: gross sensation intact  Vascular:  Palpable peripheral pulse in noted extremity  Musculoskeletal Extremities:  tenderness over operative extremity. Moves all extremities well, no to minimal LE edema, no cyanosis, no redness            Diagnostic Tests:  Lab Results (last 7 days)       ** No results found for the last 168 hours. **            Radiology images/reports reviewed and as indicated previously in the note.       Assessment: Left humeral shaft fracture and underwent ORIF 2 weeks ago and construct is failed and screws pulled out of bone.    Plan: Left humerus fracture ORIF revision with potential bone graft (allograft versus autograft versus synthetic). We will plan on proceeding with  surgery at patient's request. Dr. Haque has reviewed details of procedure with patient today and discussed risks, benefits, alternatives, and limitations of the procedure in laymen's terms with the risks including but not limited to:  neurovascular damage, bleeding, infection, chronic pain, worsening of pain, chondrolysis, recurrent problem,  swelling, loss of motion, weakness, stiffness, instability, DVT, pulmonary embolus, death, stroke, complex regional pain syndrome, and need for additional procedures.  No guarantees were given regarding results of surgery.     Patient was given the opportunity to ask and have all questions answered today.  The patient voiced understanding of the risks, benefits, and alternative forms of treatment that were discussed and the patient consents to proceed with surgery.     Discharge Plan: Home with f/u in with Dr. Haque  Date: 1/19/2024    Praful Haque MD  [facility]  Orthopaedic Surgery, Sports Medicine  [phone]

## 2024-01-19 NOTE — ANESTHESIA POSTPROCEDURE EVALUATION
Patient: Elena Siddiuqi    Procedure Summary       Date: 01/19/24 Room / Location: [facility] OR [address] [place] [address] [facility]    Anesthesia Start: 1203 Anesthesia Stop: 1553    Procedure: LEFT HUMERUS OPEN REDUCTION INTERNAL FIXATION REVISION with hardware removal and bone graft (Left: Arm Upper) Diagnosis:     Surgeons: Praful Haque MD Provider: Gagan Nash MD    Anesthesia Type: general ASA Status: 3            Anesthesia Type: general    Vitals  Vitals Value Taken Time   /54 01/19/24 1600   Temp 36.8 °C (98.2 °F) 01/19/24 1551   Pulse 77 01/19/24 1613   Resp 20 01/19/24 1555   SpO2 96 % 01/19/24 1613   Vitals shown include unfiled device data.        Post Anesthesia Care and Evaluation    Patient location during evaluation: bedside  Patient participation: complete - patient participated  Level of consciousness: awake  Pain management: adequate    Airway patency: patent  Anesthetic complications: No anesthetic complications  PONV Status: controlled  Cardiovascular status: acceptable  Respiratory status: acceptable  Hydration status: acceptable    Comments: /52   Pulse 70   Temp 36.8 °C (98.2 °F) (Oral)   Resp 20   SpO2 100%

## 2024-01-19 NOTE — BRIEF OP NOTE
HUMERUS DISTAL OPEN REDUCTION INTERNAL FIXATION  Progress Note    Elena Siddiqui  1/19/2024    Pre-op Diagnosis:   Left humerus fracture       Post-Op Diagnosis Codes:  Left humerus fracture    Procedure/CPT® Codes:        Procedure(s):  LEFT HUMERUS OPEN REDUCTION INTERNAL FIXATION REVISION with hardware removal and bone graft              Surgeon(s):  Praful Haque MD    Anesthesia: General with Block    Staff:   Circulator: Fabiano Cantor RN  Scrub Person: Ana Luisa Carney  Vendor Representative: Vi Ball  Assistant: Miles Cortez IV, CSFA  Assistant: Miles Cortez IV, CSFA      Estimated Blood Loss: 200 mL    Urine Voided: * No values recorded between 1/19/2024 12:03 PM and 1/19/2024  3:39 PM *    Specimens:                None          Drains: * No LDAs found *    Findings: See dictated op report        Complications: None apparent time of dictation    Assistant: Miles Cortez IV, CSFA  was responsible for performing the following activities: Retraction, Suction, Irrigation, Suturing, Closing, and Placing Dressing and their skilled assistance was necessary for the success of this case.    Praful Haque MD     Date: 1/19/2024  Time: 15:55 EST

## 2024-09-25 DIAGNOSIS — I60.31: Primary | ICD-10-CM

## 2024-09-27 ENCOUNTER — TELEPHONE (OUTPATIENT)
Dept: NEUROSURGERY | Facility: CLINIC | Age: 79
End: 2024-09-27
Payer: MEDICARE

## 2024-11-22 ENCOUNTER — HOSPITAL ENCOUNTER (OUTPATIENT)
Dept: MRI IMAGING | Facility: HOSPITAL | Age: 79
Discharge: HOME | End: 2024-11-22
Payer: MEDICARE

## 2024-11-22 DIAGNOSIS — I60.31: ICD-10-CM

## 2024-11-22 PROCEDURE — A9577 INJ MULTIHANCE: HCPCS | Performed by: RADIOLOGY

## 2024-11-22 PROCEDURE — 70546 MR ANGIOGRAPH HEAD W/O&W/DYE: CPT

## 2024-11-22 PROCEDURE — 25510000002 GADOBENATE DIMEGLUMINE 529 MG/ML SOLUTION: Performed by: RADIOLOGY

## 2024-11-22 RX ADMIN — GADOBENATE DIMEGLUMINE 15 ML: 529 INJECTION, SOLUTION INTRAVENOUS at 16:53

## (undated) DEVICE — GLV SURG TRIUMPH CLASSIC PF LTX 8 STRL

## (undated) DEVICE — TBG PENCL TELESCP MEGADYNE SMOKE EVAC 10FT

## (undated) DEVICE — UNDERCAST PADDING: Brand: DEROYAL

## (undated) DEVICE — PROXIMATE RH ROTATING HEAD SKIN STAPLERS (35 REGULAR) CONTAINS 35 STAINLESS STEEL STAPLES: Brand: PROXIMATE

## (undated) DEVICE — GOWN,PREVENTION PLUS,XLONG/XLARGE,STRL: Brand: MEDLINE

## (undated) DEVICE — SPNG LAP 18X18IN LF STRL PK/5

## (undated) DEVICE — ARM SLING: Brand: DEROYAL

## (undated) DEVICE — PK ORTHO MINOR 40

## (undated) DEVICE — GLV SURG PREMIERPRO ORTHO LTX PF SZ8 BRN

## (undated) DEVICE — DRP C/ARM 41X74IN

## (undated) DEVICE — LP VESL MAXI 2.5X1MM RED 2PK

## (undated) DEVICE — BNDG ELAS ELITE V/CLOSE 4IN 5YD LF STRL

## (undated) DEVICE — GLV SURG SENSICARE PI LF PF 7.5 GRN STRL

## (undated) DEVICE — OCCLUSIVE GAUZE STRIP,3% BISMUTH TRIBROMOPHENATE IN PETROLATUM BLEND: Brand: XEROFORM

## (undated) DEVICE — PK ORTHO MAJ 40

## (undated) DEVICE — TOWEL,OR,DSP,ST,BLUE,STD,4/PK,20PK/CS: Brand: MEDLINE

## (undated) DEVICE — Device

## (undated) DEVICE — SHOE P/OP/CAST O/T FML LG 8/10

## (undated) DEVICE — DISPOSABLE TOURNIQUET CUFF SINGLE BLADDER, SINGLE PORT AND QUICK CONNECT CONNECTOR: Brand: COLOR CUFF

## (undated) DEVICE — SOL ISO/ALC RUB 70PCT 4OZ

## (undated) DEVICE — WEBRIL* CAST PADDING: Brand: DEROYAL

## (undated) DEVICE — 3M™ IOBAN™ 2 ANTIMICROBIAL INCISE DRAPE 6650EZ: Brand: IOBAN™ 2

## (undated) DEVICE — STCKNT IMPERV 12IN STRL

## (undated) DEVICE — DRSNG SLVR/ANTIBAC PRIMASEAL POST/OP ADHS 3.5X12IN

## (undated) DEVICE — GOWN,REINFORCE,POLY,SIRUS,BREATH SLV,XLG: Brand: MEDLINE

## (undated) DEVICE — PAD,ABDOMINAL,8"X10",ST,LF: Brand: MEDLINE

## (undated) DEVICE — BANDAGE,GAUZE,CONFORMING,4"X75",STRL,LF: Brand: MEDLINE

## (undated) DEVICE — GOWN,NON-REINFORCED,SIRUS,SET IN SLV,XL: Brand: MEDLINE

## (undated) DEVICE — GLV SURG BIOGEL LTX PF 8

## (undated) DEVICE — MICRO SAGITTAL BLADE OFFSET (5.5 X 0.38 X 29.6MM)

## (undated) DEVICE — EVOS SMALL 2.5MM DRILL W/AO QC SHORT: Brand: EVOS

## (undated) DEVICE — SUT VIC 3/0 SH 27IN J416H

## (undated) DEVICE — SPNG GZ WOVN 4X4IN 12PLY 10/BX STRL

## (undated) DEVICE — PK ORTHO MINOR TOWER 40

## (undated) DEVICE — DRAPE,U/ SHT,SPLIT,PLAS,STERIL: Brand: MEDLINE

## (undated) DEVICE — BIT DRL LNG 1.7MM

## (undated) DEVICE — TRAP FLD MINIVAC MEGADYNE 100ML

## (undated) DEVICE — SKIN PREP TRAY W/CHG: Brand: MEDLINE INDUSTRIES, INC.

## (undated) DEVICE — GLV SURG PREMIERPRO ORTHO LTX PF SZ7.5 BRN

## (undated) DEVICE — SUT VIC 3/0 CT2 27IN J232H

## (undated) DEVICE — NDL HYPO PRECISIONGLIDE REG 25G 1 1/2

## (undated) DEVICE — SUT MNCRYL 3/0 PS2 18IN MCP497G

## (undated) DEVICE — DRSNG TELFA PAD NONADH STR 1S 3X8IN

## (undated) DEVICE — 3M™ STERI-STRIP™ REINFORCED ADHESIVE SKIN CLOSURES, R1547, 1/2 IN X 4 IN (12 MM X 100 MM), 6 STRIPS/ENVELOPE: Brand: 3M™ STERI-STRIP™

## (undated) DEVICE — APPL CHLORAPREP W/TINT 26ML ORNG

## (undated) DEVICE — SUT ETHLN 3/0 PC5 18IN 1893G

## (undated) DEVICE — SUT MNCRYL 4/0 PS2 27IN UD MCP426H

## (undated) DEVICE — STPLR SKIN VISISTAT WD 35CT

## (undated) DEVICE — ENCORE® LATEX ORTHO SIZE 8, STERILE LATEX POWDER-FREE SURGICAL GLOVE: Brand: ENCORE

## (undated) DEVICE — BNDG ESMARK 4IN 12FT LF STRL BLU